# Patient Record
Sex: FEMALE | Race: WHITE | NOT HISPANIC OR LATINO | Employment: FULL TIME | URBAN - METROPOLITAN AREA
[De-identification: names, ages, dates, MRNs, and addresses within clinical notes are randomized per-mention and may not be internally consistent; named-entity substitution may affect disease eponyms.]

---

## 2019-10-08 ENCOUNTER — OFFICE VISIT (OUTPATIENT)
Dept: INTERNAL MEDICINE | Facility: CLINIC | Age: 29
End: 2019-10-08

## 2019-10-08 ENCOUNTER — HOSPITAL ENCOUNTER (OUTPATIENT)
Dept: GENERAL RADIOLOGY | Facility: HOSPITAL | Age: 29
Discharge: HOME | End: 2019-10-08
Admitting: NURSE PRACTITIONER

## 2019-10-08 VITALS
HEIGHT: 62 IN | TEMPERATURE: 97.7 F | HEART RATE: 88 BPM | WEIGHT: 180 LBS | SYSTOLIC BLOOD PRESSURE: 108 MMHG | BODY MASS INDEX: 33.13 KG/M2 | OXYGEN SATURATION: 98 % | DIASTOLIC BLOOD PRESSURE: 80 MMHG

## 2019-10-08 DIAGNOSIS — M79.671 RIGHT FOOT PAIN: Primary | ICD-10-CM

## 2019-10-08 DIAGNOSIS — M79.671 RIGHT FOOT PAIN: ICD-10-CM

## 2019-10-08 PROCEDURE — 99203 OFFICE O/P NEW LOW 30 MIN: CPT | Performed by: NURSE PRACTITIONER

## 2019-10-08 PROCEDURE — 73630 X-RAY EXAM OF FOOT: CPT

## 2019-10-08 RX ORDER — FOLIC ACID 1 MG/1
1 TABLET ORAL DAILY
COMMUNITY
End: 2021-01-28 | Stop reason: HOSPADM

## 2019-10-08 RX ORDER — BUSPIRONE HYDROCHLORIDE 10 MG/1
TABLET ORAL
Status: ON HOLD | COMMUNITY
Start: 2019-09-26 | End: 2021-01-28 | Stop reason: SDUPTHER

## 2019-10-08 RX ORDER — SUMATRIPTAN 100 MG/1
TABLET, FILM COATED ORAL
COMMUNITY
Start: 2019-09-24 | End: 2021-01-03 | Stop reason: HOSPADM

## 2019-10-08 RX ORDER — BISOPROLOL FUMARATE 5 MG/1
5 TABLET, FILM COATED ORAL DAILY
COMMUNITY
End: 2021-01-03 | Stop reason: HOSPADM

## 2019-10-08 RX ORDER — ONDANSETRON HYDROCHLORIDE 8 MG/1
TABLET, FILM COATED ORAL
COMMUNITY
Start: 2019-09-16 | End: 2019-11-13

## 2019-10-08 RX ORDER — LANOLIN ALCOHOL/MO/W.PET/CERES
1000 CREAM (GRAM) TOPICAL DAILY
COMMUNITY
End: 2021-01-28 | Stop reason: HOSPADM

## 2019-10-08 NOTE — PROGRESS NOTES
Chief Complaint   Patient presents with   • Foot Pain     numb on top of toes       History of Present Illness    Cheyanne Santa is a 29 y.o. female who presents today for   Foot Injury    The incident occurred 12 to 24 hours ago. The incident occurred at home. The injury mechanism was a fall (Walking upstairs and slipped back onto step below landing on ball of foot with toes hyperextended.). The pain is present in the right foot and right toes. The quality of the pain is described as aching and shooting. The pain is at a severity of 8/10. The pain is moderate. The pain has been constant since onset. Associated symptoms include an inability to bear weight and numbness (2nd, 3rd, and 4th toes). Pertinent negatives include no loss of motion, loss of sensation, muscle weakness or tingling. The symptoms are aggravated by weight bearing. She has tried ice, elevation and NSAIDs for the symptoms. The treatment provided mild relief.      [facility]    The following portions of the patient's history were reviewed and updated as appropriate: allergies, current medications, past family history, past medical history, past social history, past surgical history and problem list.     Social History     Tobacco Use   • Smoking status: Never Smoker   • Smokeless tobacco: Never Used   Substance Use Topics   • Alcohol use: No     Frequency: Never       Past Medical History:   Diagnosis Date   • Anxiety    • Depression    • Headache    • PCOS (polycystic ovarian syndrome)    • Syncope        Past Surgical History:   Procedure Laterality Date   • APPENDECTOMY     • GALLBLADDER SURGERY  2005   • WISDOM TOOTH EXTRACTION  2007       Family History   Problem Relation Age of Onset   • Cancer Mother    • Hypertension Mother    • Hypertension Father    • Anxiety disorder Father    • Alcohol abuse Father    • Hearing loss Maternal Grandfather        Allergies   Allergen Reactions   • Morphine Anaphylaxis   • Reglan [Metoclopramide] Anaphylaxis  "        Current Outpatient Medications:   •  Biotin 5000 MCG capsule, Take 1 capsule by mouth Daily., Disp: , Rfl:   •  bisoprolol (ZEBeta) 5 MG tablet, Take 5 mg by mouth Daily., Disp: , Rfl:   •  busPIRone (BUSPAR) 10 MG tablet, , Disp: , Rfl:   •  folic acid (FOLVITE) 1 MG tablet, Take 1 mg by mouth Daily., Disp: , Rfl:   •  norethindrone (AYGESTIN) 5 MG tablet, , Disp: , Rfl:   •  ondansetron (ZOFRAN) 8 MG tablet, , Disp: , Rfl:   •  SUMAtriptan (IMITREX) 100 MG tablet, , Disp: , Rfl:   •  vitamin B-12 (CYANOCOBALAMIN) 1000 MCG tablet, Take 1,000 mcg by mouth Daily., Disp: , Rfl:     Review of Systems  Review of Systems   Constitutional: Positive for activity change. Negative for chills, diaphoresis, fatigue and fever.   Respiratory: Negative for chest tightness, shortness of breath and wheezing.    Cardiovascular: Negative for chest pain, palpitations and leg swelling.   Musculoskeletal: Positive for arthralgias, gait problem and joint swelling. Negative for myalgias.   Skin: Negative for color change, rash and wound.   Neurological: Positive for numbness (2nd, 3rd, and 4th toes). Negative for dizziness, tingling, weakness, light-headedness and headaches.   Psychiatric/Behavioral: Negative for sleep disturbance.       Vitals:  Vitals:    10/08/19 1415   BP: 108/80   Pulse: 88   Temp: 97.7 °F (36.5 °C)   TempSrc: Oral   SpO2: 98%   Weight: 81.6 kg (180 lb)   Height: 157.5 cm (62\")   PainSc:   5       Physical Exam  Physical Exam   Constitutional: She is oriented to person, place, and time. Vital signs are normal. She appears well-developed and well-nourished.   Cardiovascular: Normal rate, regular rhythm and normal heart sounds.   Pulmonary/Chest: Effort normal and breath sounds normal.   Musculoskeletal: She exhibits tenderness.        Right foot: There is tenderness, bony tenderness and swelling. There is normal range of motion, normal capillary refill, no crepitus, no deformity and no laceration.      "   Neurological: She is alert and oriented to person, place, and time.   Skin: Skin is warm, dry and intact. No rash noted.   Psychiatric: She has a normal mood and affect. Her behavior is normal.   Nursing note and vitals reviewed.      Labs  No results found for this or any previous visit.    Assessment/Plan  Cheyanne was seen today for foot pain.    Diagnoses and all orders for this visit:    Right foot pain  -     XR Foot 3+ View Right; Future    right foot X-ray today  To r/o bony abnormality, no evidence of fracture upon physical exam. Advise rest; intermittent ice therapy 15-20 min, 2-3x/day; Ibuprofen every 6-8 hours as needed for pain; elevation when at rest. Try to avoid prolonged periods of walking or standing. Will notify of imaging results when received and refer as necessary.    Plan of care reviewed with patient at conclusion of today's visit. Patient education was provided regarding diagnosis, management, and prescribed or recommended OTC medications. Patient was informed to notify office of any new, worsening, or persistent symptoms. Patient verbalized understanding and agreement with plan of care.     Follow-Up  Return if symptoms worsen or fail to improve.    Electronically Signed By:  ALEX Arias

## 2019-10-09 ENCOUNTER — TELEPHONE (OUTPATIENT)
Dept: INTERNAL MEDICINE | Facility: CLINIC | Age: 29
End: 2019-10-09

## 2019-10-10 ENCOUNTER — TELEPHONE (OUTPATIENT)
Dept: INTERNAL MEDICINE | Facility: CLINIC | Age: 29
End: 2019-10-10

## 2019-10-11 ENCOUNTER — OFFICE VISIT (OUTPATIENT)
Dept: INTERNAL MEDICINE | Facility: CLINIC | Age: 29
End: 2019-10-11

## 2019-10-11 VITALS
DIASTOLIC BLOOD PRESSURE: 72 MMHG | OXYGEN SATURATION: 98 % | WEIGHT: 183 LBS | HEART RATE: 99 BPM | HEIGHT: 62 IN | BODY MASS INDEX: 33.68 KG/M2 | TEMPERATURE: 98.2 F | SYSTOLIC BLOOD PRESSURE: 116 MMHG

## 2019-10-11 DIAGNOSIS — M79.674 PAIN IN RIGHT TOE(S): Primary | ICD-10-CM

## 2019-10-11 PROCEDURE — 99213 OFFICE O/P EST LOW 20 MIN: CPT | Performed by: NURSE PRACTITIONER

## 2019-10-11 RX ORDER — IBUPROFEN 600 MG/1
600 TABLET ORAL EVERY 6 HOURS PRN
Qty: 15 TABLET | Refills: 0 | Status: SHIPPED | OUTPATIENT
Start: 2019-10-11 | End: 2021-01-03 | Stop reason: HOSPADM

## 2019-10-11 RX ORDER — PREDNISONE 10 MG/1
30 TABLET ORAL DAILY
Qty: 12 TABLET | Refills: 0 | Status: SHIPPED | OUTPATIENT
Start: 2019-10-11 | End: 2019-10-15

## 2019-10-11 NOTE — PROGRESS NOTES
Chief Complaint   Patient presents with   • Toe Pain     stabbing pain and numbness       History of Present Illness    Cheyanne Santa is a 29 y.o. female who presents today for toe pain after fall.  Toe Pain    The incident occurred 3 to 5 days ago (5 days). The incident occurred at home. The injury mechanism was a fall (hyperextension of toes when stepping backward and catching self after slippping backward on stairs). The pain is present in the right toes and right foot. The quality of the pain is described as aching. The pain has been constant since onset. Associated symptoms include numbness (2nd, 3rd, and 4th toes). Pertinent negatives include no loss of motion, muscle weakness or tingling. Associated symptoms comments: Pain with weight bearing. She reports no foreign bodies present. She has tried acetaminophen, elevation, ice, immobilization, NSAIDs and rest for the symptoms. The treatment provided mild relief.      [facility]    The following portions of the patient's history were reviewed and updated as appropriate: allergies, current medications, past family history, past medical history, past social history, past surgical history and problem list.     Social History     Tobacco Use   • Smoking status: Never Smoker   • Smokeless tobacco: Never Used   Substance Use Topics   • Alcohol use: No     Frequency: Never       Past Medical History:   Diagnosis Date   • Anxiety    • Depression    • Headache    • PCOS (polycystic ovarian syndrome)    • Syncope        Past Surgical History:   Procedure Laterality Date   • APPENDECTOMY     • GALLBLADDER SURGERY  2005   • WISDOM TOOTH EXTRACTION  2007       Family History   Problem Relation Age of Onset   • Cancer Mother    • Hypertension Mother    • Hypertension Father    • Anxiety disorder Father    • Alcohol abuse Father    • Hearing loss Maternal Grandfather        Allergies   Allergen Reactions   • Morphine Anaphylaxis   • Reglan [Metoclopramide] Anaphylaxis  "        Current Outpatient Medications:   •  Biotin 5000 MCG capsule, Take 1 capsule by mouth Daily., Disp: , Rfl:   •  bisoprolol (ZEBeta) 5 MG tablet, Take 5 mg by mouth Daily., Disp: , Rfl:   •  busPIRone (BUSPAR) 10 MG tablet, , Disp: , Rfl:   •  folic acid (FOLVITE) 1 MG tablet, Take 1 mg by mouth Daily., Disp: , Rfl:   •  norethindrone (AYGESTIN) 5 MG tablet, , Disp: , Rfl:   •  ondansetron (ZOFRAN) 8 MG tablet, , Disp: , Rfl:   •  SUMAtriptan (IMITREX) 100 MG tablet, , Disp: , Rfl:   •  vitamin B-12 (CYANOCOBALAMIN) 1000 MCG tablet, Take 1,000 mcg by mouth Daily., Disp: , Rfl:   •  ibuprofen (ADVIL,MOTRIN) 600 MG tablet, Take 1 tablet by mouth Every 6 (Six) Hours As Needed for Mild Pain  or Moderate Pain ., Disp: 15 tablet, Rfl: 0  •  predniSONE (DELTASONE) 10 MG tablet, Take 3 tablets by mouth Daily for 4 days., Disp: 12 tablet, Rfl: 0    Review of Systems  Review of Systems   Constitutional: Positive for activity change. Negative for chills, diaphoresis, fatigue and fever.   Respiratory: Negative for chest tightness, shortness of breath and wheezing.    Cardiovascular: Negative for chest pain, palpitations and leg swelling.   Musculoskeletal: Positive for arthralgias and gait problem. Negative for joint swelling and myalgias.   Skin: Negative for color change, rash and wound.   Neurological: Positive for numbness (2nd, 3rd, and 4th toes). Negative for dizziness, tingling, weakness, light-headedness and headaches.   Psychiatric/Behavioral: Negative for sleep disturbance.       Vitals:  Vitals:    10/11/19 1812   BP: 116/72   Pulse: 99   Temp: 98.2 °F (36.8 °C)   TempSrc: Oral   SpO2: 98%   Weight: 83 kg (183 lb)   Height: 157.5 cm (62.01\")   PainSc:   8       Physical Exam  Physical Exam   Constitutional: She is oriented to person, place, and time. Vital signs are normal. She appears well-developed and well-nourished.   Cardiovascular: Normal rate, regular rhythm and normal heart sounds.   Pulmonary/Chest: " Effort normal and breath sounds normal. No respiratory distress. She has no decreased breath sounds. She has no wheezes.   Musculoskeletal: She exhibits tenderness.        Right foot: There is tenderness. There is normal range of motion, no bony tenderness, no swelling, normal capillary refill, no crepitus, no deformity and no laceration.        Neurological: She is alert and oriented to person, place, and time. No sensory deficit. She exhibits normal muscle tone. Gait (limp due to pain with weight-bearing) abnormal. Coordination normal.   Skin: Skin is warm, dry and intact. No bruising, no ecchymosis and no lesion noted. No erythema.   Psychiatric: She has a normal mood and affect. Her behavior is normal.   Nursing note and vitals reviewed.      Labs  No results found for this or any previous visit.    Assessment/Plan  Cheyanne was seen today for toe pain.    Diagnoses and all orders for this visit:    Pain in right toe(s)  -     predniSONE (DELTASONE) 10 MG tablet; Take 3 tablets by mouth Daily for 4 days.  -     ibuprofen (ADVIL,MOTRIN) 600 MG tablet; Take 1 tablet by mouth Every 6 (Six) Hours As Needed for Mild Pain  or Moderate Pain .    Oral steroids to help with inflammation. Start Ibuprofen after finishing steroid therapy. Advised in rest, intermittent ice therapy, compression with Ace bandage, and elevation. May try metatarsal pad for added comfort. Avoid prolonged periods of standing or walking. Typical time frame for resolution of significant strain 2-3 weeks. Follow-up if worsening or no improvement for ortho referral.     Plan of care reviewed with patient at conclusion of today's visit. Patient education was provided regarding diagnosis, management, and prescribed or recommended OTC medications. Patient was informed to notify office of any new, worsening, or persistent symptoms. Patient verbalized understanding and agreement with plan of care.     Follow-Up  Return if symptoms worsen or fail to  improve.    Electronically Signed By:  ALEX Arias

## 2019-10-28 ENCOUNTER — OFFICE VISIT (OUTPATIENT)
Dept: INTERNAL MEDICINE | Facility: CLINIC | Age: 29
End: 2019-10-28

## 2019-10-28 VITALS
DIASTOLIC BLOOD PRESSURE: 80 MMHG | BODY MASS INDEX: 32.84 KG/M2 | SYSTOLIC BLOOD PRESSURE: 100 MMHG | OXYGEN SATURATION: 99 % | WEIGHT: 179.6 LBS | HEART RATE: 78 BPM

## 2019-10-28 DIAGNOSIS — M79.671 ACUTE FOOT PAIN, RIGHT: Primary | ICD-10-CM

## 2019-10-28 PROCEDURE — 99213 OFFICE O/P EST LOW 20 MIN: CPT | Performed by: PHYSICIAN ASSISTANT

## 2019-10-28 NOTE — PROGRESS NOTES
Chief Complaint   Patient presents with   • Right Foot Pain     Acute       Subjective   Cheyanne Santa is a 29 y.o. female.       History of Present Illness     2-3 weeks ago pt injured the toes on her right foot. Came in to be seen and was given steroids that helped her pain. She has been resting her foot but still has pain when she walks. Using new insoles and it does help but still hurts when she takes a step. Less swollen than it was. Increased pain when she spends any amount of time on her feet- works as a  so this is difficult.      Current Outpatient Medications:   •  Biotin 5000 MCG capsule, Take 1 capsule by mouth Daily., Disp: , Rfl:   •  bisoprolol (ZEBeta) 5 MG tablet, Take 5 mg by mouth Daily., Disp: , Rfl:   •  busPIRone (BUSPAR) 10 MG tablet, , Disp: , Rfl:   •  folic acid (FOLVITE) 1 MG tablet, Take 1 mg by mouth Daily., Disp: , Rfl:   •  ibuprofen (ADVIL,MOTRIN) 600 MG tablet, Take 1 tablet by mouth Every 6 (Six) Hours As Needed for Mild Pain  or Moderate Pain ., Disp: 15 tablet, Rfl: 0  •  norethindrone (AYGESTIN) 5 MG tablet, , Disp: , Rfl:   •  ondansetron (ZOFRAN) 8 MG tablet, , Disp: , Rfl:   •  SUMAtriptan (IMITREX) 100 MG tablet, , Disp: , Rfl:   •  vitamin B-12 (CYANOCOBALAMIN) 1000 MCG tablet, Take 1,000 mcg by mouth Daily., Disp: , Rfl:      [facility]  The following portions of the patient's history were reviewed and updated as appropriate: allergies, current medications, past family history, past medical history, past social history, past surgical history and problem list.    Review of Systems   Constitutional: Negative for activity change, appetite change and fatigue.   HENT: Negative for congestion and rhinorrhea.    Respiratory: Negative for chest tightness and shortness of breath.    Cardiovascular: Negative for chest pain and palpitations.   Gastrointestinal: Negative for abdominal pain.   Genitourinary: Negative for dysuria.   Musculoskeletal: Positive for arthralgias and  myalgias.   Neurological: Negative for dizziness, weakness, light-headedness and headaches.   Psychiatric/Behavioral: Negative for dysphoric mood. The patient is not nervous/anxious.        Objective   /80   Pulse 78   Wt 81.5 kg (179 lb 9.6 oz)   SpO2 99%   BMI 32.84 kg/m²     Physical Exam   Constitutional: She appears well-developed and well-nourished.   HENT:   Head: Normocephalic.   Right Ear: Hearing, tympanic membrane, external ear and ear canal normal.   Left Ear: Hearing, tympanic membrane, external ear and ear canal normal.   Nose: Nose normal.   Mouth/Throat: Oropharynx is clear and moist.   Eyes: Conjunctivae are normal. Pupils are equal, round, and reactive to light.   Neck: Normal range of motion.   Cardiovascular: Normal rate, regular rhythm and normal heart sounds.   Pulmonary/Chest: Effort normal and breath sounds normal. She has no decreased breath sounds. She has no wheezes. She has no rhonchi. She has no rales.   Musculoskeletal:        Right foot: There is tenderness and bony tenderness (distal metatarsal). There is normal range of motion, no swelling and no deformity.   Neurological: She is alert.   Skin: Skin is warm and dry.   Psychiatric: She has a normal mood and affect. Her behavior is normal.   Nursing note and vitals reviewed.      No results found for this or any previous visit.     ASSESSMENT/PLAN    Problem List Items Addressed This Visit     None      Visit Diagnoses     Acute foot pain, right    -  Primary    Continue rest, prn ibuprofen. Refer for MRI of foot due to persistent pain.    Relevant Orders    MRI foot right wo contrast               Return if symptoms worsen or fail to improve.

## 2019-11-05 ENCOUNTER — TELEPHONE (OUTPATIENT)
Dept: INTERNAL MEDICINE | Facility: CLINIC | Age: 29
End: 2019-11-05

## 2019-11-05 DIAGNOSIS — M79.671 RIGHT FOOT PAIN: Primary | ICD-10-CM

## 2019-11-05 NOTE — TELEPHONE ENCOUNTER
Patient was calling to inquire if the approval for her mri had been obtained before proceeding with the xray or further things like physical therapy patient does not have the financial capability to pay for an mri without insurance approval. Please call the patient back and advise her if this has been approved or not

## 2019-11-06 NOTE — TELEPHONE ENCOUNTER
I have routed the referral back to Carmen and she is going to inform Aysha that the MRI has been denied.

## 2019-11-06 NOTE — TELEPHONE ENCOUNTER
Per msg in referral: PER AIM, AUTH WAS DENIED. ADVANCED IMAGING MAY BE APPROPRIATE WHEN CONSERVATIVE TREATMENT REQUIREMENTS HAVE BEEN MET & SYMPTOMS HAVE NOT IMPROVED OR RESOLVED. FURTHER CLINICAL REVIEW IS REQD. PEER TO PEER CAN BE COMPLETED WITHIN 20 DAYS BY CALLING [facility]     Please contact the patient and inform her that her MRI has been denied. Thanks!

## 2019-11-12 NOTE — TELEPHONE ENCOUNTER
Pt advised MRI has been denied, she has already had the xray, she is willing to do the physical therapy, printed order and she will  and take it to her place of choice

## 2019-11-12 NOTE — TELEPHONE ENCOUNTER
Please let her know that we were notified of the denial of her MRI. She will need to get an xray and try physical therapy before it can be approved. I will put in an order for both of these- someone will call her about the PT and she can go to a diagnostic center for the xray.

## 2019-11-13 ENCOUNTER — OFFICE VISIT (OUTPATIENT)
Dept: INTERNAL MEDICINE | Facility: CLINIC | Age: 29
End: 2019-11-13

## 2019-11-13 VITALS
DIASTOLIC BLOOD PRESSURE: 74 MMHG | TEMPERATURE: 98.2 F | HEART RATE: 98 BPM | WEIGHT: 179 LBS | OXYGEN SATURATION: 98 % | BODY MASS INDEX: 32.94 KG/M2 | HEIGHT: 62 IN | SYSTOLIC BLOOD PRESSURE: 110 MMHG

## 2019-11-13 DIAGNOSIS — R11.2 NAUSEA AND VOMITING, INTRACTABILITY OF VOMITING NOT SPECIFIED, UNSPECIFIED VOMITING TYPE: Primary | ICD-10-CM

## 2019-11-13 DIAGNOSIS — A08.4 VIRAL GASTROENTERITIS: ICD-10-CM

## 2019-11-13 LAB
EXPIRATION DATE: NORMAL
FLUAV AG NPH QL: NEGATIVE
FLUBV AG NPH QL: NEGATIVE
INTERNAL CONTROL: NORMAL
Lab: NORMAL

## 2019-11-13 PROCEDURE — 87804 INFLUENZA ASSAY W/OPTIC: CPT | Performed by: NURSE PRACTITIONER

## 2019-11-13 PROCEDURE — 99213 OFFICE O/P EST LOW 20 MIN: CPT | Performed by: NURSE PRACTITIONER

## 2019-11-13 RX ORDER — ONDANSETRON 4 MG/1
4 TABLET, ORALLY DISINTEGRATING ORAL EVERY 8 HOURS PRN
Qty: 20 TABLET | Refills: 0 | Status: SHIPPED | OUTPATIENT
Start: 2019-11-13 | End: 2020-01-14 | Stop reason: SDUPTHER

## 2019-11-13 NOTE — PROGRESS NOTES
Chief Complaint   Patient presents with   • Vomiting     x3 days   • Headache   • Diarrhea     x3 days       History of Present Illness    Cheyanne Santa is a 29 y.o. female who presents today for nausea, vomiting, headache.    Vomiting    This is a new problem. Episode onset: 3 days. The problem occurs intermittently. The problem has been waxing and waning. The emesis has an appearance of stomach contents (denies bile, BRB, coffee ground emesis). There has been no fever. Associated symptoms include abdominal pain (cramping), chills, diarrhea (4x/day), headaches, myalgias and URI. Pertinent negatives include no chest pain, coughing, dizziness, fever or sweats. Associated symptoms comments: Rhinorrhea, congestion. Risk factors include ill contacts. She has tried increased fluids (ibuprofen) for the symptoms. The treatment provided mild relief.   active hemorrhoids - straining some with BM. Loose stools, bright yellow.    [facility]    The following portions of the patient's history were reviewed and updated as appropriate: allergies, current medications, past family history, past medical history, past social history, past surgical history and problem list.     Social History     Tobacco Use   • Smoking status: Never Smoker   • Smokeless tobacco: Never Used   Substance Use Topics   • Alcohol use: No     Frequency: Never       Past Medical History:   Diagnosis Date   • Anxiety    • Depression    • Headache    • PCOS (polycystic ovarian syndrome)    • Syncope        Past Surgical History:   Procedure Laterality Date   • APPENDECTOMY     • GALLBLADDER SURGERY  2005   • WISDOM TOOTH EXTRACTION  2007       Allergies   Allergen Reactions   • Morphine Anaphylaxis   • Reglan [Metoclopramide] Anaphylaxis         Current Outpatient Medications:   •  Biotin 5000 MCG capsule, Take 1 capsule by mouth Daily., Disp: , Rfl:   •  bisoprolol (ZEBeta) 5 MG tablet, Take 5 mg by mouth Daily., Disp: , Rfl:   •  busPIRone (BUSPAR) 10 MG  "tablet, , Disp: , Rfl:   •  folic acid (FOLVITE) 1 MG tablet, Take 1 mg by mouth Daily., Disp: , Rfl:   •  ibuprofen (ADVIL,MOTRIN) 600 MG tablet, Take 1 tablet by mouth Every 6 (Six) Hours As Needed for Mild Pain  or Moderate Pain ., Disp: 15 tablet, Rfl: 0  •  norethindrone (AYGESTIN) 5 MG tablet, , Disp: , Rfl:   •  SUMAtriptan (IMITREX) 100 MG tablet, , Disp: , Rfl:   •  vitamin B-12 (CYANOCOBALAMIN) 1000 MCG tablet, Take 1,000 mcg by mouth Daily., Disp: , Rfl:   •  ondansetron ODT (ZOFRAN-ODT) 4 MG disintegrating tablet, Take 1 tablet by mouth Every 8 (Eight) Hours As Needed for Nausea or Vomiting., Disp: 20 tablet, Rfl: 0    Review of Systems  Review of Systems   Constitutional: Positive for chills, diaphoresis and fatigue. Negative for fever.   HENT: Positive for congestion, postnasal drip and rhinorrhea. Negative for sinus pressure, sinus pain, sneezing, sore throat and trouble swallowing.    Eyes: Negative for visual disturbance.   Respiratory: Negative for cough, chest tightness, shortness of breath and wheezing.    Cardiovascular: Negative for chest pain and palpitations.   Gastrointestinal: Positive for abdominal pain (cramping), diarrhea (4x/day), nausea and vomiting. Negative for abdominal distention and constipation.   Genitourinary: Negative for dysuria, frequency and hematuria.   Musculoskeletal: Positive for myalgias.   Neurological: Positive for headaches. Negative for dizziness, light-headedness and numbness.   Psychiatric/Behavioral: Negative for sleep disturbance.       Vitals:  Vitals:    11/13/19 1659   BP: 110/74   Pulse: 98   Temp: 98.2 °F (36.8 °C)   TempSrc: Oral   SpO2: 98%   Weight: 81.2 kg (179 lb)   Height: 157.5 cm (62.01\")       Physical Exam  Physical Exam   Constitutional: She is oriented to person, place, and time. Vital signs are normal. She appears well-developed and well-nourished.   HENT:   Head: Normocephalic and atraumatic.   Right Ear: Tympanic membrane, external ear and " ear canal normal.   Left Ear: Tympanic membrane, external ear and ear canal normal.   Nose: Nose normal. No mucosal edema, rhinorrhea, sinus tenderness or congestion. Right sinus exhibits no maxillary sinus tenderness and no frontal sinus tenderness. Left sinus exhibits no maxillary sinus tenderness and no frontal sinus tenderness.   Mouth/Throat: Uvula is midline, oropharynx is clear and moist and mucous membranes are normal. No tonsillar exudate.   Eyes: Conjunctivae and lids are normal. Pupils are equal, round, and reactive to light.   Neck: Trachea normal, normal range of motion and phonation normal. Neck supple. No JVD present.   Cardiovascular: Normal rate, regular rhythm and normal heart sounds.   Pulmonary/Chest: Effort normal and breath sounds normal. No respiratory distress. She has no decreased breath sounds. She has no wheezes.   Abdominal: Soft. Normal appearance and bowel sounds are normal. She exhibits no distension, no abdominal bruit and no mass. There is no hepatosplenomegaly. There is no tenderness. There is no rigidity, no rebound, no guarding, no tenderness at McBurney's point and negative Romano's sign. No hernia.   Lymphadenopathy:        Head (right side): No submental, no submandibular, no tonsillar, no preauricular, no posterior auricular and no occipital adenopathy present.        Head (left side): No submental, no submandibular, no tonsillar, no preauricular, no posterior auricular and no occipital adenopathy present.     She has no cervical adenopathy.   Neurological: She is alert and oriented to person, place, and time.   Skin: Skin is warm and dry.   Psychiatric: She has a normal mood and affect. Her behavior is normal.   Nursing note and vitals reviewed.      Labs  Results for orders placed or performed in visit on 11/13/19   POC Influenza A / B   Result Value Ref Range    Rapid Influenza A Ag Negative Negative    Rapid Influenza B Ag Negative Negative    Internal Control Passed  Passed    Lot Number      Expiration Date         Assessment/Plan    Cheyanne was seen today for vomiting, headache and diarrhea.    Diagnoses and all orders for this visit:    Nausea and vomiting, intractability of vomiting not specified, unspecified vomiting type  -     POC Influenza A / B    Viral gastroenteritis  -     ondansetron ODT (ZOFRAN-ODT) 4 MG disintegrating tablet; Take 1 tablet by mouth Every 8 (Eight) Hours As Needed for Nausea or Vomiting.    Advised in oral rehydration therapy with water and electrolyte replacement. Encouraged bland diet and avoidance of offending foods. OTC anti-diarrheals recommended sparingly. Encouraged in proper hygiene measures prior to preparing and consuming foods and after bowel or bladder habits to avoid cross contamination to others in household. Advised in typical duration of 2-4 days for resolution of viral gastroenteritis symptoms. Encouraged to seek further follow-up in office of ER treatment after hours for worsening symptoms and/or inability to keep food/fluids down for 24 hours or more. Follow-up in 2 days if no improvement or worsening.      Plan of care reviewed with patient at conclusion of tday's visit. Patient education was provided regarding diagnosis, management, and prescribed or recommended OTC medications. Patient was informed to notify office of any new, worsening, or persistent symptoms. Patient verbalized understanding and agreement with plan of care.     Follow-Up  Return in about 2 days (around 11/15/2019), or if symptoms worsen or fail to improve.    Electronically Signed By:  ALEX Arias

## 2019-11-19 ENCOUNTER — OFFICE VISIT (OUTPATIENT)
Dept: OBSTETRICS AND GYNECOLOGY | Facility: CLINIC | Age: 29
End: 2019-11-19

## 2019-11-19 ENCOUNTER — APPOINTMENT (OUTPATIENT)
Dept: LAB | Facility: HOSPITAL | Age: 29
End: 2019-11-19

## 2019-11-19 VITALS
DIASTOLIC BLOOD PRESSURE: 82 MMHG | HEIGHT: 62 IN | WEIGHT: 177.2 LBS | SYSTOLIC BLOOD PRESSURE: 124 MMHG | BODY MASS INDEX: 32.61 KG/M2

## 2019-11-19 DIAGNOSIS — N94.10 FEMALE DYSPAREUNIA: ICD-10-CM

## 2019-11-19 DIAGNOSIS — N92.6 IRREGULAR MENSES: Primary | ICD-10-CM

## 2019-11-19 LAB
ESTRADIOL SERPL HS-MCNC: 31.6 PG/ML
FSH SERPL-ACNC: 6.93 MIU/ML
PROLACTIN SERPL-MCNC: 8.83 NG/ML (ref 4.79–23.3)
TESTOST SERPL-MCNC: 9.48 NG/DL (ref 8.4–48.1)
TSH SERPL DL<=0.05 MIU/L-ACNC: 2.48 UIU/ML (ref 0.27–4.2)

## 2019-11-19 PROCEDURE — 99203 OFFICE O/P NEW LOW 30 MIN: CPT | Performed by: OBSTETRICS & GYNECOLOGY

## 2019-11-19 PROCEDURE — 82627 DEHYDROEPIANDROSTERONE: CPT | Performed by: OBSTETRICS & GYNECOLOGY

## 2019-11-19 PROCEDURE — 36415 COLL VENOUS BLD VENIPUNCTURE: CPT | Performed by: OBSTETRICS & GYNECOLOGY

## 2019-11-19 PROCEDURE — 84146 ASSAY OF PROLACTIN: CPT | Performed by: OBSTETRICS & GYNECOLOGY

## 2019-11-19 PROCEDURE — 84443 ASSAY THYROID STIM HORMONE: CPT | Performed by: OBSTETRICS & GYNECOLOGY

## 2019-11-19 PROCEDURE — 83498 ASY HYDROXYPROGESTERONE 17-D: CPT | Performed by: OBSTETRICS & GYNECOLOGY

## 2019-11-19 PROCEDURE — 84403 ASSAY OF TOTAL TESTOSTERONE: CPT | Performed by: OBSTETRICS & GYNECOLOGY

## 2019-11-19 PROCEDURE — 83001 ASSAY OF GONADOTROPIN (FSH): CPT | Performed by: OBSTETRICS & GYNECOLOGY

## 2019-11-19 PROCEDURE — 82670 ASSAY OF TOTAL ESTRADIOL: CPT | Performed by: OBSTETRICS & GYNECOLOGY

## 2019-11-19 NOTE — PROGRESS NOTES
Subjective   Chief Complaint   Patient presents with   • Gynecologic Exam     diagnosed w/PCOS at age 12; pt here for a second opinion/confirmation; c/o pain w/SIC and bleeding     Cheyanne Santa is a 29 y.o. year old .  No LMP recorded (lmp unknown). Patient is not currently having periods (Reason: Oral contraceptives).  She presents to be seen because of irregular menses and pain with intercourse. Patient reports that at around age 12-14 she was told that she was diagnosed with PCOS. She states that she was having irregular and infrequent cycles. She is uncertain of the work-up but does remember having an ultrasound in which she was told she had cysts. Patient reports that in her early twenties she discontinued the OCPs secondary to nausea and no need for contraception at the the time. Patient states that when she went off the OCPs that she her cycles were unpredictable, infrequent and heavy. Patient has been seeing a GYN in [place] who has been prescribing Aygestin 5mg once daily for the last year. Patient reports that her menses are painful and that she also experiences this cramping like pain when she is not bleeding. She reports many years ago she had an abundance of unwanted hair growth and reports in last several years she has been experiencing hair loss. Patient also reports pain with intercourse for the last 5 years. Patient reports she experiences a stabbing like pain in her lower abdomen with intercourse. She has no discomfort with insertion. She reports that she also experiences bleeding. Last pap smear: . Denies any history of abnormal pap smears. Denies any history of sexually transmitted infections. Denies any history of sexual or physical abuse. Patient has a history of anxiety and depression. This issues stem from losing her mother to breast cancer 11 years ago. She is currently taking Buspar. She does not participate in counseling.    OTHER THINGS SHE WANTS TO DISCUSS  "TODAY:  Nothing else    The following portions of the patient's history were reviewed and updated as appropriate:current medications, allergies, past family history, past medical history, past social history and past surgical history    Social History    Tobacco Use      Smoking status: Never Smoker      Smokeless tobacco: Never Used    Review of Systems  Constitutional POS: fatigue    NEG: anorexia or night sweats   Genitourinary POS: nothing reported    NEG: dysuria or hematuria   Gastointestinal POS: nothing reported    NEG: bloating, change in bowel habits, melena or reflux symptoms   Integument POS: nothing reported    NEG: moles that are changing in size, shape, color or rashes   Breast POS: nothing reported    NEG: persistent breast lump, skin dimpling or nipple discharge         Objective   /82   Ht 157.5 cm (62.01\")   Wt 80.4 kg (177 lb 3.2 oz)   LMP  (LMP Unknown)   Breastfeeding? No   BMI 32.40 kg/m²     General:  well developed; well nourished  no acute distress   Skin:  Not performed.   Thyroid: not examined   Lungs:  breathing is unlabored   Heart:  Not performed.   Breasts:  Not performed.   Abdomen: soft, non-tender; no masses  no umbilical or inguinal hernias are present  no hepato-splenomegaly   Pelvis: Exam limited by  anxiety  Clinical staff was present for exam  External genitalia:  normal appearance of the external genitalia including Bartholin's and Dumfries's glands.  :  urethral meatus normal;  Vaginal:  normal pink mucosa without prolapse or lesions.  Cervix:  normal appearance.  Uterus:  normal size, shape and consistency.  Adnexa:  normal bimanual exam of the adnexa.  Pelvic floor pain: her symptoms ARE reproduced with palpation under the bladder base;     Lab Review   No data reviewed    Imaging   No data reviewed        Assessment   1. Irregular Menses  2. Dyspareunia     Plan   1. The following tests were ordered today: DHEA-s, ESR, FSH, PRL, total testosterone, TSH and " 17-OHP.  It was explained to Cheyanne that all lab test should be back within the one week after they are performed. She will be notified about the results, regardless of the findings. If she has not been contacted by the office within 2 weeks after the test has been performed, it is her responsibility to contact us to learn about her results.  2. Ultrasound needs to be done any time that is convenient for her.   3. The importance of keeping all planned follow-up and taking all medications as prescribed was emphasized.  4. Follow up after ultrasound.    No orders of the defined types were placed in this encounter.         This note was electronically signed.    Enmanuel Ochoa M.D.  November 19, 2019    Note: Speech recognition transcription software may have been used to create portions of this document.  An attempt at proofreading has been made but errors in transcription could still be present.

## 2019-11-20 LAB — DHEA-S SERPL-MCNC: 92.8 UG/DL (ref 84.8–378)

## 2019-11-22 LAB — 17OHP SERPL-MCNC: 26 NG/DL

## 2019-12-02 ENCOUNTER — OFFICE VISIT (OUTPATIENT)
Dept: OBSTETRICS AND GYNECOLOGY | Facility: CLINIC | Age: 29
End: 2019-12-02

## 2019-12-02 VITALS
HEIGHT: 62 IN | BODY MASS INDEX: 32.76 KG/M2 | DIASTOLIC BLOOD PRESSURE: 84 MMHG | SYSTOLIC BLOOD PRESSURE: 118 MMHG | WEIGHT: 178 LBS

## 2019-12-02 DIAGNOSIS — N94.10 FEMALE DYSPAREUNIA: ICD-10-CM

## 2019-12-02 DIAGNOSIS — N92.6 IRREGULAR MENSES: Primary | ICD-10-CM

## 2019-12-02 PROCEDURE — 99213 OFFICE O/P EST LOW 20 MIN: CPT | Performed by: OBSTETRICS & GYNECOLOGY

## 2019-12-02 NOTE — PROGRESS NOTES
"Subjective   Chief Complaint   Patient presents with   • Follow-up     f/u after u/s     Cheyanne Santa is a 29 y.o. year old  who comes to review her recent testing and discuss next steps.    OTHER THINGS SHE WANTS TO DISCUSS TODAY:  Nothing else       Objective   /84   Ht 157.5 cm (62.01\")   Wt 80.7 kg (178 lb)   LMP  (LMP Unknown)   Breastfeeding? No   BMI 32.55 kg/m²     Lab Review   FSH, Prolactin, TSH, Estradiol, Testosterone, DHEAS, 17-OHP    Imaging   Pelvic ultrasound report       Assessment   1. Irregular Menses  2. Dyspareunia     Plan   1. Discussed normal lab and ultrasound results.  2. Will start on lo-loestrin for cycle irregularity and dysmenorrhea. Instructed patient it can take 3-4 cycles to regulate.  3. Referral to physical therapy for pelvic floor therapy.  4. The importance of keeping all planned follow-up and taking all medications as prescribed was emphasized.  5. Follow up for recheck of cycles in 4 months    New Medications Ordered This Visit   Medications   • Norethin-Eth Estrad-Fe Biphas 1 MG-10 MCG / 10 MCG tablet     Sig: Take 1 tablet by mouth Daily.     Dispense:  84 tablet     Refill:  4          Total time spent today with Cheyanne  was 20 minutes (level 3).  Greater than 50% of the time was spent face-to-face coordinating care, answering her questions and counseling regarding pathophysiology of her presenting problem along with plans for any diagnositc work-up and treatment.    This note was electronically signed.    Kaylan Keith, DO  2019    Note: Speech recognition transcription software may have been used to create portions of this document.  An attempt at proofreading has been made but errors in transcription could still be present.    "

## 2020-01-14 ENCOUNTER — TELEPHONE (OUTPATIENT)
Dept: OBSTETRICS AND GYNECOLOGY | Facility: CLINIC | Age: 30
End: 2020-01-14

## 2020-01-14 DIAGNOSIS — A08.4 VIRAL GASTROENTERITIS: ICD-10-CM

## 2020-01-14 RX ORDER — ONDANSETRON 4 MG/1
4 TABLET, ORALLY DISINTEGRATING ORAL EVERY 8 HOURS PRN
Qty: 20 TABLET | Refills: 0 | Status: SHIPPED | OUTPATIENT
Start: 2020-01-14 | End: 2020-02-07 | Stop reason: SDUPTHER

## 2020-02-07 ENCOUNTER — TELEPHONE (OUTPATIENT)
Dept: OBSTETRICS AND GYNECOLOGY | Facility: CLINIC | Age: 30
End: 2020-02-07

## 2020-02-07 DIAGNOSIS — A08.4 VIRAL GASTROENTERITIS: ICD-10-CM

## 2020-02-07 RX ORDER — ONDANSETRON 4 MG/1
4 TABLET, ORALLY DISINTEGRATING ORAL DAILY
Qty: 5 TABLET | Refills: 0 | Status: SHIPPED | OUTPATIENT
Start: 2020-02-07 | End: 2020-03-02 | Stop reason: SDUPTHER

## 2020-02-07 NOTE — TELEPHONE ENCOUNTER
Spoke with Cheyanne, she is requesting Zofran prescription refill from Dr Keith since her OCP makes her nauseous on a daily basis. I advised Cheyanne that this issue wasn't noted in Dr Keith's notes from her visits therefore I would be required to contact Dr Keith for refill permission. Prescription for Zofran 4mg ODT #5 sent to her pharmacy until Dr Keith is back I office on Monday.

## 2020-03-02 ENCOUNTER — TELEPHONE (OUTPATIENT)
Dept: OBSTETRICS AND GYNECOLOGY | Facility: CLINIC | Age: 30
End: 2020-03-02

## 2020-03-02 DIAGNOSIS — A08.4 VIRAL GASTROENTERITIS: ICD-10-CM

## 2020-03-02 RX ORDER — ONDANSETRON 4 MG/1
4 TABLET, ORALLY DISINTEGRATING ORAL DAILY
Qty: 30 TABLET | Refills: 1 | Status: SHIPPED | OUTPATIENT
Start: 2020-03-02 | End: 2021-01-03 | Stop reason: HOSPADM

## 2020-03-02 NOTE — TELEPHONE ENCOUNTER
Refill sent. Patient was scheduled for follow up in April when I am out. It doesn't look like that has been rescheduled -- can we get her on the schedule?

## 2020-03-02 NOTE — TELEPHONE ENCOUNTER
Pt returned call; states she needs another refill on Zofran--she takes these with her OCP because they make her nauseous. Pt advised this would be routed to Dr. Keith for review.

## 2020-07-10 LAB
EXTERNAL CHLAMYDIA SCREEN: NEGATIVE
EXTERNAL GONORRHEA SCREEN: NEGATIVE
EXTERNAL HEPATITIS B SURFACE ANTIGEN: NEGATIVE
EXTERNAL RUBELLA QUALITATIVE: NORMAL
HIV1 P24 AG SERPL QL IA: NORMAL

## 2020-10-30 LAB
EXTERNAL GLUCOSE TOLERANCE TEST FASTING: 95 MG/DL
EXTERNAL SYPHILIS RPR SCREEN: NORMAL

## 2020-12-31 ENCOUNTER — TELEPHONE (OUTPATIENT)
Dept: OBSTETRICS AND GYNECOLOGY | Facility: CLINIC | Age: 30
End: 2020-12-31

## 2021-01-03 ENCOUNTER — HOSPITAL ENCOUNTER (OUTPATIENT)
Facility: HOSPITAL | Age: 31
End: 2021-01-03
Attending: OBSTETRICS & GYNECOLOGY | Admitting: OBSTETRICS & GYNECOLOGY

## 2021-01-03 ENCOUNTER — HOSPITAL ENCOUNTER (OUTPATIENT)
Facility: HOSPITAL | Age: 31
Setting detail: OBSERVATION
Discharge: HOME | End: 2021-01-03
Attending: OBSTETRICS & GYNECOLOGY | Admitting: OBSTETRICS & GYNECOLOGY

## 2021-01-03 VITALS
HEART RATE: 116 BPM | RESPIRATION RATE: 18 BRPM | BODY MASS INDEX: 40.12 KG/M2 | SYSTOLIC BLOOD PRESSURE: 112 MMHG | WEIGHT: 218 LBS | TEMPERATURE: 98.3 F | HEIGHT: 62 IN | DIASTOLIC BLOOD PRESSURE: 76 MMHG

## 2021-01-03 PROBLEM — O36.8190 DECREASED FETAL MOVEMENT: Status: ACTIVE | Noted: 2021-01-03

## 2021-01-03 PROCEDURE — G0378 HOSPITAL OBSERVATION PER HR: HCPCS

## 2021-01-03 PROCEDURE — 59025 FETAL NON-STRESS TEST: CPT | Performed by: OBSTETRICS & GYNECOLOGY

## 2021-01-03 PROCEDURE — 99218 PR INITIAL OBSERVATION CARE/DAY 30 MINUTES: CPT | Performed by: OBSTETRICS & GYNECOLOGY

## 2021-01-03 PROCEDURE — 59025 FETAL NON-STRESS TEST: CPT

## 2021-01-03 NOTE — H&P
[facility]  Obstetric History and Physical    Referring Provider: Gilberto Hawley DO      Chief Complaint   Patient presents with   • Decreased Fetal Movement       Subjective     Patient is a 30 y.o. female  currently at 37w3d, who presents with C/O decreased fetal movement.  Patient reports decreased fetal movement today without associated leaking of fluid, vaginal bleeding, recent trauma, fever, short of breath, chest pain or  Any other associated symptoms or concerns.  Patient does feel fetal movement but however just less than usual.  Prenatal care in [facility], currently transferring care to [facility] of [place].  Reports prenatal care has been uneventful to date.        The following portions of the patients history were reviewed and updated as appropriate: current medications, allergies, past medical history, past surgical history, past family history, past social history and problem list .       Prenatal Information:   Maternal Prenatal Labs  Blood Type No results found for: ABO   Rh Status No results found for: RH   Antibody Screen No results found for: ABSCRN   Gonnorhea No results found for: GCCX   Chlamydia No results found for: CLAMYDCU   RPR No results found for: RPR   Syphilis Antibody No results found for: SYPHILIS   Rubella No results found for: RUBELLAIGGIN   Hepatitis B Surface Antigen No results found for: HEPBSAG   HIV-1 Antibody No results found for: LABHIV1   Hepatitis C Antibody No results found for: HEPCAB   Rapid Urin Drug Screen No results found for: AMPMETHU, BARBITSCNUR, LABBENZSCN, LABMETHSCN, LABOPIASCN, THCURSCR, COCAINEUR, AMPHETSCREEN, PROPOXSCN, BUPRENORSCNU, METAMPSCNUR, OXYCODONESCN, TRICYCLICSCN   Group B Strep Culture No results found for: GBSANTIGEN                 Past OB History:       OB History    Para Term  AB Living   1 0 0 0 0 0   SAB TAB Ectopic Molar Multiple Live Births   0 0 0 0 0 0      # Outcome Date GA Lbr Juan R/2nd Weight Sex  Delivery Anes PTL Lv   1 Current                Past Medical History: Past Medical History:   Diagnosis Date   • Anxiety    • Depression    • Migraines    • PCOS (polycystic ovarian syndrome)    • Polycystic ovary disease 2002   • Polycystic ovary syndrome 2002   • Syncope       Past Surgical History Past Surgical History:   Procedure Laterality Date   • APPENDECTOMY     • GALLBLADDER SURGERY  2005   • WISDOM TOOTH EXTRACTION  2007      Family History: Family History   Problem Relation Age of Onset   • Hypertension Mother    • Breast cancer Mother 46   • Hypertension Father    • Anxiety disorder Father    • Alcohol abuse Father    • Hearing loss Maternal Grandfather    • Ovarian cancer Neg Hx    • Uterine cancer Neg Hx    • Endometrial cancer Neg Hx    • Colon cancer Neg Hx       Social History:  reports that she has never smoked. She has never used smokeless tobacco.   reports no history of alcohol use.   reports no history of drug use.                   General ROS Negative Findings:Headaches, Visual Changes, Epigastric pain, Anorexia, Nausia/Vomiting, ROM and Vaginal Bleeding    ROS     All other systems have been reviewed and are neg  Objective       Vital Signs Range for the last 24 hours  Temperature: Temp:  [98.3 °F (36.8 °C)] 98.3 °F (36.8 °C)   Temp Source: Temp src: Oral   BP: BP: (112-121)/(76-80) 112/76   Pulse: Heart Rate:  [116-130] 116   Respirations: Resp:  [18] 18   SPO2:     O2 Amount (l/min):     O2 Devices     Weight: Weight:  [98.9 kg (218 lb)] 98.9 kg (218 lb)     Physical Examination:   General:   alert, appears stated age and cooperative   Skin:   normal   HEENT:  Sclera clear   Lungs:      Heart:      Gastrointestinal:  Soft, gravid uterus nontender, no guarding, rebound benign exam   Lower Extremities:  Trace edema, no calf tenderness   : Exam deferred.   Musculoskeletal:   No deformities noted full range of motion upper lower extremity   Neuro:  No focal deficits, DTR 2+ 4 no clonus                Fetal Heart Rate Assessment   Method:     Beats/min:     Baseline:     Varibility:     Accels:     Decels:     Tracing Category:     NST-indication decreased fetal movement, interpretation reactive, moderate variability, accelerations present 15 x 15, no decelerations noted, onset 1450, end time 1536, rare contraction noted  Uterine Assessment   Method: Method: palpation   Frequency (min):     Ctx Count in 10 min:     Duration:     Intensity:     Intensity by IUPC:     Resting Tone: Uterine Resting Tone: soft by palpation   Resting Tone by IUPC:     Burr Units:       Laboratory Results:   Lab Results (last 24 hours)     ** No results found for the last 24 hours. **        Radiology Review:   Imaging Results (Last 24 Hours)     ** No results found for the last 24 hours. **        Other Studies:    Assessment/Plan       Decreased fetal movement        Assessment:  1.  Intrauterine pregnancy at 37w3d weeks gestation with reactive fetal status.    2.  History of decreased fetal movement-normal fetal assessment  3.   4.      Plan:  1.  Discharged home, kick count, labor instructions follow-up provider this week for routine  visit or as needed  2. Plan of care has been reviewed with patient.  3.  Risks, benefits of treatment plan have been discussed.  4.  All questions have been answered.  5      Gilberto Hawley,   1/3/2021  15:48 EST

## 2021-01-05 ENCOUNTER — LAB (OUTPATIENT)
Dept: LAB | Facility: HOSPITAL | Age: 31
End: 2021-01-05

## 2021-01-05 ENCOUNTER — TRANSCRIBE ORDERS (OUTPATIENT)
Dept: LAB | Facility: HOSPITAL | Age: 31
End: 2021-01-05

## 2021-01-05 DIAGNOSIS — Z3A.37 37 WEEKS GESTATION OF PREGNANCY: ICD-10-CM

## 2021-01-05 DIAGNOSIS — Z34.83 PRENATAL CARE, SUBSEQUENT PREGNANCY, THIRD TRIMESTER: ICD-10-CM

## 2021-01-05 DIAGNOSIS — Z3A.37 37 WEEKS GESTATION OF PREGNANCY: Primary | ICD-10-CM

## 2021-01-05 LAB
AMPHET+METHAMPHET UR QL: NEGATIVE
AMPHETAMINES UR QL: NEGATIVE
BARBITURATES UR QL SCN: NEGATIVE
BENZODIAZ UR QL SCN: NEGATIVE
BUPRENORPHINE SERPL-MCNC: NEGATIVE NG/ML
CANNABINOIDS SERPL QL: NEGATIVE
COCAINE UR QL: NEGATIVE
EXTERNAL GROUP B STREP ANTIGEN: NEGATIVE
METHADONE UR QL SCN: NEGATIVE
OPIATES UR QL: NEGATIVE
OXYCODONE UR QL SCN: NEGATIVE
PCP UR QL SCN: NEGATIVE
PROPOXYPH UR QL: NEGATIVE
TRICYCLICS UR QL SCN: NEGATIVE

## 2021-01-05 PROCEDURE — 80306 DRUG TEST PRSMV INSTRMNT: CPT

## 2021-01-24 ENCOUNTER — APPOINTMENT (OUTPATIENT)
Dept: PREADMISSION TESTING | Facility: HOSPITAL | Age: 31
End: 2021-01-24

## 2021-01-25 ENCOUNTER — HOSPITAL ENCOUNTER (INPATIENT)
Facility: HOSPITAL | Age: 31
LOS: 3 days | Discharge: HOME | End: 2021-01-28
Attending: OBSTETRICS & GYNECOLOGY | Admitting: OBSTETRICS & GYNECOLOGY

## 2021-01-25 DIAGNOSIS — E66.01 MORBIDLY OBESE (HCC): ICD-10-CM

## 2021-01-25 PROBLEM — O48.0 POST TERM PREGNANCY OVER 40 WEEKS: Status: ACTIVE | Noted: 2021-01-25

## 2021-01-25 LAB
ABO GROUP BLD: NORMAL
ABO GROUP BLD: NORMAL
ALP SERPL-CCNC: 155 U/L (ref 39–117)
ALT SERPL W P-5'-P-CCNC: 9 U/L (ref 1–33)
AST SERPL-CCNC: 17 U/L (ref 1–32)
BACTERIA UR QL AUTO: ABNORMAL /HPF
BILIRUB SERPL-MCNC: 0.2 MG/DL (ref 0–1.2)
BILIRUB UR QL STRIP: NEGATIVE
BILIRUB UR QL STRIP: NEGATIVE
BLD GP AB SCN SERPL QL: NEGATIVE
CLARITY UR: CLEAR
CLARITY UR: CLEAR
COLOR UR: YELLOW
COLOR UR: YELLOW
CREAT SERPL-MCNC: 0.46 MG/DL (ref 0.57–1)
DEPRECATED RDW RBC AUTO: 45.1 FL (ref 37–54)
ERYTHROCYTE [DISTWIDTH] IN BLOOD BY AUTOMATED COUNT: 13.3 % (ref 12.3–15.4)
GLUCOSE UR STRIP-MCNC: NEGATIVE MG/DL
GLUCOSE UR STRIP-MCNC: NEGATIVE MG/DL
HCT VFR BLD AUTO: 38.3 % (ref 34–46.6)
HGB BLD-MCNC: 12.3 G/DL (ref 12–15.9)
HGB UR QL STRIP.AUTO: NEGATIVE
HGB UR QL STRIP.AUTO: NEGATIVE
HYALINE CASTS UR QL AUTO: ABNORMAL /LPF
KETONES UR QL STRIP: NEGATIVE
KETONES UR QL STRIP: NEGATIVE
LDH SERPL-CCNC: 243 U/L (ref 135–214)
LEUKOCYTE ESTERASE UR QL STRIP.AUTO: ABNORMAL
LEUKOCYTE ESTERASE UR QL STRIP.AUTO: ABNORMAL
MCH RBC QN AUTO: 29.8 PG (ref 26.6–33)
MCHC RBC AUTO-ENTMCNC: 32.1 G/DL (ref 31.5–35.7)
MCV RBC AUTO: 92.7 FL (ref 79–97)
NITRITE UR QL STRIP: NEGATIVE
NITRITE UR QL STRIP: NEGATIVE
PH UR STRIP.AUTO: 7 [PH] (ref 5–8)
PH UR STRIP.AUTO: 7 [PH] (ref 5–8)
PLATELET # BLD AUTO: 240 10*3/MM3 (ref 140–450)
PMV BLD AUTO: 11.1 FL (ref 6–12)
PROT UR QL STRIP: NEGATIVE
PROT UR QL STRIP: NEGATIVE
RBC # BLD AUTO: 4.13 10*6/MM3 (ref 3.77–5.28)
RBC # UR: ABNORMAL /HPF
REF LAB TEST METHOD: ABNORMAL
RH BLD: POSITIVE
RH BLD: POSITIVE
SARS-COV-2 RDRP RESP QL NAA+PROBE: DETECTED
SP GR UR STRIP: 1.01 (ref 1–1.03)
SP GR UR STRIP: 1.01 (ref 1–1.03)
SQUAMOUS #/AREA URNS HPF: ABNORMAL /HPF
T&S EXPIRATION DATE: NORMAL
URATE SERPL-MCNC: 4.8 MG/DL (ref 2.4–5.7)
UROBILINOGEN UR QL STRIP: ABNORMAL
UROBILINOGEN UR QL STRIP: ABNORMAL
WBC # BLD AUTO: 12.59 10*3/MM3 (ref 3.4–10.8)
WBC UR QL AUTO: ABNORMAL /HPF

## 2021-01-25 PROCEDURE — 59025 FETAL NON-STRESS TEST: CPT

## 2021-01-25 PROCEDURE — 81001 URINALYSIS AUTO W/SCOPE: CPT | Performed by: NURSE PRACTITIONER

## 2021-01-25 PROCEDURE — 87635 SARS-COV-2 COVID-19 AMP PRB: CPT | Performed by: OBSTETRICS & GYNECOLOGY

## 2021-01-25 PROCEDURE — 86901 BLOOD TYPING SEROLOGIC RH(D): CPT | Performed by: NURSE PRACTITIONER

## 2021-01-25 PROCEDURE — 36415 COLL VENOUS BLD VENIPUNCTURE: CPT | Performed by: NURSE PRACTITIONER

## 2021-01-25 PROCEDURE — 82247 BILIRUBIN TOTAL: CPT | Performed by: NURSE PRACTITIONER

## 2021-01-25 PROCEDURE — 86850 RBC ANTIBODY SCREEN: CPT | Performed by: NURSE PRACTITIONER

## 2021-01-25 PROCEDURE — 59200 INSERT CERVICAL DILATOR: CPT | Performed by: OBSTETRICS & GYNECOLOGY

## 2021-01-25 PROCEDURE — 84550 ASSAY OF BLOOD/URIC ACID: CPT | Performed by: NURSE PRACTITIONER

## 2021-01-25 PROCEDURE — 84450 TRANSFERASE (AST) (SGOT): CPT | Performed by: NURSE PRACTITIONER

## 2021-01-25 PROCEDURE — 86900 BLOOD TYPING SEROLOGIC ABO: CPT | Performed by: NURSE PRACTITIONER

## 2021-01-25 PROCEDURE — 85027 COMPLETE CBC AUTOMATED: CPT | Performed by: NURSE PRACTITIONER

## 2021-01-25 PROCEDURE — 86901 BLOOD TYPING SEROLOGIC RH(D): CPT

## 2021-01-25 PROCEDURE — 84075 ASSAY ALKALINE PHOSPHATASE: CPT | Performed by: NURSE PRACTITIONER

## 2021-01-25 PROCEDURE — 86900 BLOOD TYPING SEROLOGIC ABO: CPT

## 2021-01-25 PROCEDURE — 82565 ASSAY OF CREATININE: CPT | Performed by: NURSE PRACTITIONER

## 2021-01-25 PROCEDURE — 25010000003 MEPERIDINE PER 100 MG: Performed by: ADVANCED PRACTICE MIDWIFE

## 2021-01-25 PROCEDURE — 83615 LACTATE (LD) (LDH) ENZYME: CPT | Performed by: NURSE PRACTITIONER

## 2021-01-25 PROCEDURE — 84460 ALANINE AMINO (ALT) (SGPT): CPT | Performed by: NURSE PRACTITIONER

## 2021-01-25 RX ORDER — SODIUM CHLORIDE 0.9 % (FLUSH) 0.9 %
10 SYRINGE (ML) INJECTION EVERY 12 HOURS SCHEDULED
Status: DISCONTINUED | OUTPATIENT
Start: 2021-01-25 | End: 2021-01-27

## 2021-01-25 RX ORDER — MISOPROSTOL 200 UG/1
800 TABLET ORAL AS NEEDED
Status: DISCONTINUED | OUTPATIENT
Start: 2021-01-25 | End: 2021-01-26 | Stop reason: HOSPADM

## 2021-01-25 RX ORDER — OXYTOCIN-SODIUM CHLORIDE 0.9% IV SOLN 30 UNIT/500ML 30-0.9/5 UT/ML-%
650 SOLUTION INTRAVENOUS ONCE
Status: COMPLETED | OUTPATIENT
Start: 2021-01-25 | End: 2021-01-26

## 2021-01-25 RX ORDER — SODIUM CHLORIDE, SODIUM LACTATE, POTASSIUM CHLORIDE, CALCIUM CHLORIDE 600; 310; 30; 20 MG/100ML; MG/100ML; MG/100ML; MG/100ML
125 INJECTION, SOLUTION INTRAVENOUS CONTINUOUS
Status: DISCONTINUED | OUTPATIENT
Start: 2021-01-25 | End: 2021-01-27

## 2021-01-25 RX ORDER — OXYTOCIN-SODIUM CHLORIDE 0.9% IV SOLN 30 UNIT/500ML 30-0.9/5 UT/ML-%
85 SOLUTION INTRAVENOUS ONCE
Status: DISCONTINUED | OUTPATIENT
Start: 2021-01-25 | End: 2021-01-28 | Stop reason: HOSPADM

## 2021-01-25 RX ORDER — CARBOPROST TROMETHAMINE 250 UG/ML
250 INJECTION, SOLUTION INTRAMUSCULAR AS NEEDED
Status: DISCONTINUED | OUTPATIENT
Start: 2021-01-25 | End: 2021-01-26 | Stop reason: HOSPADM

## 2021-01-25 RX ORDER — METHYLERGONOVINE MALEATE 0.2 MG/ML
200 INJECTION INTRAVENOUS ONCE AS NEEDED
Status: DISCONTINUED | OUTPATIENT
Start: 2021-01-25 | End: 2021-01-26 | Stop reason: HOSPADM

## 2021-01-25 RX ORDER — IBUPROFEN 600 MG/1
600 TABLET ORAL EVERY 6 HOURS PRN
Status: DISCONTINUED | OUTPATIENT
Start: 2021-01-25 | End: 2021-01-26 | Stop reason: HOSPADM

## 2021-01-25 RX ORDER — OXYTOCIN-SODIUM CHLORIDE 0.9% IV SOLN 30 UNIT/500ML 30-0.9/5 UT/ML-%
2-30 SOLUTION INTRAVENOUS
Status: DISCONTINUED | OUTPATIENT
Start: 2021-01-25 | End: 2021-01-26 | Stop reason: HOSPADM

## 2021-01-25 RX ORDER — SODIUM CHLORIDE, SODIUM LACTATE, POTASSIUM CHLORIDE, CALCIUM CHLORIDE 600; 310; 30; 20 MG/100ML; MG/100ML; MG/100ML; MG/100ML
125 INJECTION, SOLUTION INTRAVENOUS CONTINUOUS
Status: DISCONTINUED | OUTPATIENT
Start: 2021-01-25 | End: 2021-01-26

## 2021-01-25 RX ORDER — SODIUM CHLORIDE 0.9 % (FLUSH) 0.9 %
10 SYRINGE (ML) INJECTION AS NEEDED
Status: DISCONTINUED | OUTPATIENT
Start: 2021-01-25 | End: 2021-01-27

## 2021-01-25 RX ORDER — MAGNESIUM CARB/ALUMINUM HYDROX 105-160MG
30 TABLET,CHEWABLE ORAL ONCE
Status: DISCONTINUED | OUTPATIENT
Start: 2021-01-25 | End: 2021-01-26 | Stop reason: HOSPADM

## 2021-01-25 RX ORDER — MEPERIDINE HYDROCHLORIDE 25 MG/ML
25 INJECTION INTRAMUSCULAR; INTRAVENOUS; SUBCUTANEOUS
Status: DISCONTINUED | OUTPATIENT
Start: 2021-01-25 | End: 2021-01-26 | Stop reason: HOSPADM

## 2021-01-25 RX ADMIN — OXYTOCIN 2 MILLI-UNITS/MIN: 10 INJECTION, SOLUTION INTRAMUSCULAR; INTRAVENOUS at 23:51

## 2021-01-25 RX ADMIN — MEPERIDINE HYDROCHLORIDE 25 MG: 25 INJECTION INTRAMUSCULAR; INTRAVENOUS; SUBCUTANEOUS at 19:58

## 2021-01-25 RX ADMIN — SODIUM CHLORIDE, POTASSIUM CHLORIDE, SODIUM LACTATE AND CALCIUM CHLORIDE 125 ML/HR: 600; 310; 30; 20 INJECTION, SOLUTION INTRAVENOUS at 12:50

## 2021-01-25 RX ADMIN — SODIUM CHLORIDE, POTASSIUM CHLORIDE, SODIUM LACTATE AND CALCIUM CHLORIDE 125 ML/HR: 600; 310; 30; 20 INJECTION, SOLUTION INTRAVENOUS at 23:51

## 2021-01-26 ENCOUNTER — ANESTHESIA EVENT (OUTPATIENT)
Dept: LABOR AND DELIVERY | Facility: HOSPITAL | Age: 31
End: 2021-01-26

## 2021-01-26 ENCOUNTER — ANESTHESIA (OUTPATIENT)
Dept: LABOR AND DELIVERY | Facility: HOSPITAL | Age: 31
End: 2021-01-26

## 2021-01-26 ENCOUNTER — APPOINTMENT (OUTPATIENT)
Dept: LABOR AND DELIVERY | Facility: HOSPITAL | Age: 31
End: 2021-01-26

## 2021-01-26 PROBLEM — O48.0 POST TERM PREGNANCY OVER 40 WEEKS: Status: RESOLVED | Noted: 2021-01-25 | Resolved: 2021-01-26

## 2021-01-26 PROBLEM — O36.8190 DECREASED FETAL MOVEMENT: Status: RESOLVED | Noted: 2021-01-03 | Resolved: 2021-01-26

## 2021-01-26 PROCEDURE — 25010000002 ROPIVACAINE PER 1 MG: Performed by: NURSE ANESTHETIST, CERTIFIED REGISTERED

## 2021-01-26 PROCEDURE — 10907ZC DRAINAGE OF AMNIOTIC FLUID, THERAPEUTIC FROM PRODUCTS OF CONCEPTION, VIA NATURAL OR ARTIFICIAL OPENING: ICD-10-PCS | Performed by: ADVANCED PRACTICE MIDWIFE

## 2021-01-26 PROCEDURE — 51702 INSERT TEMP BLADDER CATH: CPT

## 2021-01-26 PROCEDURE — 25010000002 FENTANYL CITRATE (PF) 100 MCG/2ML SOLUTION: Performed by: NURSE ANESTHETIST, CERTIFIED REGISTERED

## 2021-01-26 PROCEDURE — 3E033VJ INTRODUCTION OF OTHER HORMONE INTO PERIPHERAL VEIN, PERCUTANEOUS APPROACH: ICD-10-PCS | Performed by: ADVANCED PRACTICE MIDWIFE

## 2021-01-26 PROCEDURE — 25010000003 MEPERIDINE PER 100 MG: Performed by: ADVANCED PRACTICE MIDWIFE

## 2021-01-26 PROCEDURE — 25010000002 ONDANSETRON PER 1 MG: Performed by: NURSE ANESTHETIST, CERTIFIED REGISTERED

## 2021-01-26 PROCEDURE — 59025 FETAL NON-STRESS TEST: CPT

## 2021-01-26 PROCEDURE — C1755 CATHETER, INTRASPINAL: HCPCS

## 2021-01-26 PROCEDURE — C1755 CATHETER, INTRASPINAL: HCPCS | Performed by: ANESTHESIOLOGY

## 2021-01-26 PROCEDURE — 25010000002 ONDANSETRON PER 1 MG: Performed by: OBSTETRICS & GYNECOLOGY

## 2021-01-26 PROCEDURE — 0KQM0ZZ REPAIR PERINEUM MUSCLE, OPEN APPROACH: ICD-10-PCS | Performed by: ADVANCED PRACTICE MIDWIFE

## 2021-01-26 RX ORDER — LIDOCAINE HYDROCHLORIDE AND EPINEPHRINE 15; 5 MG/ML; UG/ML
INJECTION, SOLUTION EPIDURAL AS NEEDED
Status: DISCONTINUED | OUTPATIENT
Start: 2021-01-26 | End: 2021-01-26 | Stop reason: SURG

## 2021-01-26 RX ORDER — FENTANYL CITRATE 50 UG/ML
INJECTION, SOLUTION INTRAMUSCULAR; INTRAVENOUS AS NEEDED
Status: DISCONTINUED | OUTPATIENT
Start: 2021-01-26 | End: 2021-01-26 | Stop reason: SURG

## 2021-01-26 RX ORDER — LANOLIN
CREAM (ML) TOPICAL
Status: DISCONTINUED | OUTPATIENT
Start: 2021-01-26 | End: 2021-01-28 | Stop reason: HOSPADM

## 2021-01-26 RX ORDER — ONDANSETRON 2 MG/ML
4 INJECTION INTRAMUSCULAR; INTRAVENOUS EVERY 6 HOURS PRN
Status: DISCONTINUED | OUTPATIENT
Start: 2021-01-26 | End: 2021-01-27

## 2021-01-26 RX ORDER — IBUPROFEN 600 MG/1
600 TABLET ORAL EVERY 6 HOURS PRN
Status: DISCONTINUED | OUTPATIENT
Start: 2021-01-26 | End: 2021-01-28 | Stop reason: HOSPADM

## 2021-01-26 RX ORDER — DIPHENHYDRAMINE HYDROCHLORIDE 50 MG/ML
12.5 INJECTION INTRAMUSCULAR; INTRAVENOUS EVERY 8 HOURS PRN
Status: DISCONTINUED | OUTPATIENT
Start: 2021-01-26 | End: 2021-01-26 | Stop reason: HOSPADM

## 2021-01-26 RX ORDER — BUSPIRONE HYDROCHLORIDE 5 MG/1
10 TABLET ORAL EVERY 12 HOURS SCHEDULED
Status: DISCONTINUED | OUTPATIENT
Start: 2021-01-27 | End: 2021-01-28 | Stop reason: HOSPADM

## 2021-01-26 RX ORDER — PRENATAL VIT/IRON FUM/FOLIC AC 27MG-0.8MG
1 TABLET ORAL DAILY
Status: DISCONTINUED | OUTPATIENT
Start: 2021-01-27 | End: 2021-01-28 | Stop reason: HOSPADM

## 2021-01-26 RX ORDER — ONDANSETRON 2 MG/ML
4 INJECTION INTRAMUSCULAR; INTRAVENOUS ONCE AS NEEDED
Status: COMPLETED | OUTPATIENT
Start: 2021-01-26 | End: 2021-01-26

## 2021-01-26 RX ORDER — ACETAMINOPHEN 325 MG/1
650 TABLET ORAL EVERY 4 HOURS PRN
Status: DISCONTINUED | OUTPATIENT
Start: 2021-01-26 | End: 2021-01-28 | Stop reason: HOSPADM

## 2021-01-26 RX ORDER — TRISODIUM CITRATE DIHYDRATE AND CITRIC ACID MONOHYDRATE 500; 334 MG/5ML; MG/5ML
30 SOLUTION ORAL ONCE
Status: DISCONTINUED | OUTPATIENT
Start: 2021-01-26 | End: 2021-01-26 | Stop reason: HOSPADM

## 2021-01-26 RX ORDER — HYDROCORTISONE 25 MG/G
1 CREAM TOPICAL AS NEEDED
Status: DISCONTINUED | OUTPATIENT
Start: 2021-01-26 | End: 2021-01-28 | Stop reason: HOSPADM

## 2021-01-26 RX ORDER — FAMOTIDINE 10 MG/ML
20 INJECTION, SOLUTION INTRAVENOUS ONCE AS NEEDED
Status: DISCONTINUED | OUTPATIENT
Start: 2021-01-26 | End: 2021-01-26 | Stop reason: HOSPADM

## 2021-01-26 RX ORDER — DOCUSATE SODIUM 100 MG/1
100 CAPSULE, LIQUID FILLED ORAL 2 TIMES DAILY
Status: DISCONTINUED | OUTPATIENT
Start: 2021-01-27 | End: 2021-01-28 | Stop reason: HOSPADM

## 2021-01-26 RX ORDER — EPHEDRINE SULFATE 5 MG/ML
10 INJECTION INTRAVENOUS
Status: DISCONTINUED | OUTPATIENT
Start: 2021-01-26 | End: 2021-01-26 | Stop reason: HOSPADM

## 2021-01-26 RX ORDER — ROPIVACAINE HYDROCHLORIDE 2 MG/ML
15 INJECTION, SOLUTION EPIDURAL; INFILTRATION; PERINEURAL CONTINUOUS
Status: DISCONTINUED | OUTPATIENT
Start: 2021-01-26 | End: 2021-01-27

## 2021-01-26 RX ADMIN — LIDOCAINE HYDROCHLORIDE AND EPINEPHRINE 1 ML: 15; 5 INJECTION, SOLUTION EPIDURAL at 07:48

## 2021-01-26 RX ADMIN — ROPIVACAINE HYDROCHLORIDE 15 ML/HR: 2 INJECTION, SOLUTION EPIDURAL; INFILTRATION at 07:52

## 2021-01-26 RX ADMIN — ONDANSETRON 4 MG: 2 INJECTION INTRAMUSCULAR; INTRAVENOUS at 14:10

## 2021-01-26 RX ADMIN — ROPIVACAINE HYDROCHLORIDE: 2 INJECTION, SOLUTION EPIDURAL; INFILTRATION at 20:09

## 2021-01-26 RX ADMIN — SODIUM CHLORIDE, POTASSIUM CHLORIDE, SODIUM LACTATE AND CALCIUM CHLORIDE 1000 ML: 600; 310; 30; 20 INJECTION, SOLUTION INTRAVENOUS at 07:43

## 2021-01-26 RX ADMIN — LIDOCAINE HYDROCHLORIDE AND EPINEPHRINE 3 ML: 15; 5 INJECTION, SOLUTION EPIDURAL at 07:45

## 2021-01-26 RX ADMIN — ROPIVACAINE HYDROCHLORIDE 10 ML: 5 INJECTION, SOLUTION EPIDURAL; INFILTRATION; PERINEURAL at 07:50

## 2021-01-26 RX ADMIN — OXYTOCIN 650 ML/HR: 10 INJECTION, SOLUTION INTRAMUSCULAR; INTRAVENOUS at 22:08

## 2021-01-26 RX ADMIN — ROPIVACAINE HYDROCHLORIDE: 2 INJECTION, SOLUTION EPIDURAL; INFILTRATION at 14:10

## 2021-01-26 RX ADMIN — EPHEDRINE SULFATE 10 MG: 5 INJECTION INTRAVENOUS at 08:00

## 2021-01-26 RX ADMIN — FENTANYL CITRATE 100 MCG: 50 INJECTION, SOLUTION INTRAMUSCULAR; INTRAVENOUS at 07:48

## 2021-01-26 RX ADMIN — EPHEDRINE SULFATE 10 MG: 5 INJECTION INTRAVENOUS at 12:20

## 2021-01-26 RX ADMIN — SODIUM CHLORIDE, POTASSIUM CHLORIDE, SODIUM LACTATE AND CALCIUM CHLORIDE 125 ML/HR: 600; 310; 30; 20 INJECTION, SOLUTION INTRAVENOUS at 17:27

## 2021-01-26 RX ADMIN — ONDANSETRON 4 MG: 2 INJECTION INTRAMUSCULAR; INTRAVENOUS at 20:25

## 2021-01-26 RX ADMIN — IBUPROFEN 600 MG: 600 TABLET, FILM COATED ORAL at 22:26

## 2021-01-26 RX ADMIN — SODIUM CHLORIDE, POTASSIUM CHLORIDE, SODIUM LACTATE AND CALCIUM CHLORIDE 125 ML/HR: 600; 310; 30; 20 INJECTION, SOLUTION INTRAVENOUS at 10:01

## 2021-01-26 RX ADMIN — MEPERIDINE HYDROCHLORIDE 25 MG: 25 INJECTION INTRAMUSCULAR; INTRAVENOUS; SUBCUTANEOUS at 02:05

## 2021-01-26 NOTE — ANESTHESIA PREPROCEDURE EVALUATION
Anesthesia Evaluation     Patient summary reviewed and Nursing notes reviewed   NPO Solid Status: > 6 hours  NPO Liquid Status: < 2 hours           Airway   Mallampati: II  TM distance: >3 FB  Neck ROM: full  Dental      Pulmonary - negative pulmonary ROS   Cardiovascular - negative cardio ROS        Neuro/Psych- negative ROS  GI/Hepatic/Renal/Endo    (+) morbid obesity,      Musculoskeletal (-) negative ROS    Abdominal    Substance History - negative use     OB/GYN    (+) Pregnant,         Other - negative ROS                       Anesthesia Plan    ASA 3     epidural       Anesthetic plan, all risks, benefits, and alternatives have been provided, discussed and informed consent has been obtained with: patient.    Plan discussed with attending.

## 2021-01-26 NOTE — ANESTHESIA PROCEDURE NOTES
Labor Epidural      Patient reassessed immediately prior to procedure    Patient location during procedure: floor  Performed By  CRNA: Yuliana Barahona CRNA  Preanesthetic Checklist  Completed: patient identified, surgical consent, pre-op evaluation, timeout performed, IV checked, risks and benefits discussed and monitors and equipment checked  Prep:  Pt Position:sitting  Sterile Tech:cap, gloves, mask and sterile barrier  Prep:DuraPrep  Monitoring:blood pressure monitoring  Epidural Block Procedure:  Approach:midline  Guidance:landmark technique and palpation technique  Location:L3-L4  Needle Type:Tuohy  Needle Gauge:17 G  Loss of Resistance Medium: air  Loss of Resistance: 5cm  Cath Depth at skin:10 cm  Paresthesia: none  Aspiration:negative  Test Dose:negative  Number of Attempts: 1  Post Assessment:  Dressing:occlusive dressing applied and secured with tape  Pt Tolerance:patient tolerated the procedure well with no apparent complications  Complications:no

## 2021-01-27 PROBLEM — E66.01 MORBIDLY OBESE (HCC): Status: ACTIVE | Noted: 2021-01-27

## 2021-01-27 LAB
BASOPHILS # BLD AUTO: 0.08 10*3/MM3 (ref 0–0.2)
BASOPHILS NFR BLD AUTO: 0.4 % (ref 0–1.5)
DEPRECATED RDW RBC AUTO: 45.6 FL (ref 37–54)
EOSINOPHIL # BLD AUTO: 0.06 10*3/MM3 (ref 0–0.4)
EOSINOPHIL NFR BLD AUTO: 0.3 % (ref 0.3–6.2)
ERYTHROCYTE [DISTWIDTH] IN BLOOD BY AUTOMATED COUNT: 13.2 % (ref 12.3–15.4)
HCT VFR BLD AUTO: 32.7 % (ref 34–46.6)
HGB BLD-MCNC: 10.6 G/DL (ref 12–15.9)
IMM GRANULOCYTES # BLD AUTO: 0.19 10*3/MM3 (ref 0–0.05)
IMM GRANULOCYTES NFR BLD AUTO: 0.8 % (ref 0–0.5)
LYMPHOCYTES # BLD AUTO: 2.34 10*3/MM3 (ref 0.7–3.1)
LYMPHOCYTES NFR BLD AUTO: 10.4 % (ref 19.6–45.3)
MCH RBC QN AUTO: 30.6 PG (ref 26.6–33)
MCHC RBC AUTO-ENTMCNC: 32.4 G/DL (ref 31.5–35.7)
MCV RBC AUTO: 94.5 FL (ref 79–97)
MONOCYTES # BLD AUTO: 1.96 10*3/MM3 (ref 0.1–0.9)
MONOCYTES NFR BLD AUTO: 8.7 % (ref 5–12)
NEUTROPHILS NFR BLD AUTO: 17.83 10*3/MM3 (ref 1.7–7)
NEUTROPHILS NFR BLD AUTO: 79.4 % (ref 42.7–76)
NRBC BLD AUTO-RTO: 0 /100 WBC (ref 0–0.2)
PLATELET # BLD AUTO: 213 10*3/MM3 (ref 140–450)
PMV BLD AUTO: 10.6 FL (ref 6–12)
RBC # BLD AUTO: 3.46 10*6/MM3 (ref 3.77–5.28)
WBC # BLD AUTO: 22.46 10*3/MM3 (ref 3.4–10.8)

## 2021-01-27 PROCEDURE — 85025 COMPLETE CBC W/AUTO DIFF WBC: CPT | Performed by: ADVANCED PRACTICE MIDWIFE

## 2021-01-27 RX ADMIN — BUSPIRONE HYDROCHLORIDE 10 MG: 5 TABLET ORAL at 20:23

## 2021-01-27 RX ADMIN — ACETAMINOPHEN 650 MG: 325 TABLET, FILM COATED ORAL at 02:30

## 2021-01-27 RX ADMIN — DOCUSATE SODIUM 100 MG: 100 CAPSULE, LIQUID FILLED ORAL at 20:23

## 2021-01-27 RX ADMIN — WITCH HAZEL 1 PAD: 500 SOLUTION RECTAL; TOPICAL at 00:34

## 2021-01-27 RX ADMIN — BUSPIRONE HYDROCHLORIDE 10 MG: 5 TABLET ORAL at 00:33

## 2021-01-27 RX ADMIN — DOCUSATE SODIUM 100 MG: 100 CAPSULE, LIQUID FILLED ORAL at 09:09

## 2021-01-27 RX ADMIN — Medication: at 00:34

## 2021-01-27 RX ADMIN — IBUPROFEN 600 MG: 600 TABLET ORAL at 04:20

## 2021-01-27 RX ADMIN — Medication: at 00:33

## 2021-01-27 RX ADMIN — IBUPROFEN 600 MG: 600 TABLET ORAL at 15:36

## 2021-01-27 RX ADMIN — ACETAMINOPHEN 650 MG: 325 TABLET, FILM COATED ORAL at 20:23

## 2021-01-27 NOTE — ANESTHESIA POSTPROCEDURE EVALUATION
Patient: Cheyanne Santa    Procedure Summary     Date: 01/26/21 Room / Location:     Anesthesia Start: 0734 Anesthesia Stop: 2203    Procedure: LABOR ANALGESIA Diagnosis:     Scheduled Providers:  Provider: Faustino Garza MD    Anesthesia Type: epidural ASA Status: 3          Anesthesia Type: epidural    Vitals  Vitals Value Taken Time   BP 95/63 01/27/21 0735   Temp 97.7 °F (36.5 °C) 01/27/21 0735   Pulse 95 01/27/21 0735   Resp 18 01/27/21 0735   SpO2             Post Anesthesia Care and Evaluation    Patient location during evaluation: bedside  Patient participation: complete - patient participated  Level of consciousness: awake and awake and alert  Pain score: 0  Pain management: satisfactory to patient  Airway patency: patent  Anesthetic complications: No anesthetic complications  PONV Status: none  Cardiovascular status: acceptable  Respiratory status: acceptable  Hydration status: acceptable  Post Neuraxial Block status: Motor and sensory function returned to baseline and No signs or symptoms of PDPH

## 2021-01-28 VITALS
WEIGHT: 226 LBS | BODY MASS INDEX: 41.59 KG/M2 | RESPIRATION RATE: 18 BRPM | HEART RATE: 86 BPM | TEMPERATURE: 98 F | HEIGHT: 62 IN | SYSTOLIC BLOOD PRESSURE: 109 MMHG | DIASTOLIC BLOOD PRESSURE: 70 MMHG

## 2021-01-28 LAB
BASOPHILS # BLD AUTO: 0.07 10*3/MM3 (ref 0–0.2)
BASOPHILS NFR BLD AUTO: 0.5 % (ref 0–1.5)
DEPRECATED RDW RBC AUTO: 46.5 FL (ref 37–54)
EOSINOPHIL # BLD AUTO: 0.14 10*3/MM3 (ref 0–0.4)
EOSINOPHIL NFR BLD AUTO: 1 % (ref 0.3–6.2)
ERYTHROCYTE [DISTWIDTH] IN BLOOD BY AUTOMATED COUNT: 13.2 % (ref 12.3–15.4)
HCT VFR BLD AUTO: 32.2 % (ref 34–46.6)
HGB BLD-MCNC: 10.3 G/DL (ref 12–15.9)
IMM GRANULOCYTES # BLD AUTO: 0.12 10*3/MM3 (ref 0–0.05)
IMM GRANULOCYTES NFR BLD AUTO: 0.8 % (ref 0–0.5)
LYMPHOCYTES # BLD AUTO: 1.6 10*3/MM3 (ref 0.7–3.1)
LYMPHOCYTES NFR BLD AUTO: 11 % (ref 19.6–45.3)
MCH RBC QN AUTO: 30.7 PG (ref 26.6–33)
MCHC RBC AUTO-ENTMCNC: 32 G/DL (ref 31.5–35.7)
MCV RBC AUTO: 95.8 FL (ref 79–97)
MONOCYTES # BLD AUTO: 0.59 10*3/MM3 (ref 0.1–0.9)
MONOCYTES NFR BLD AUTO: 4.1 % (ref 5–12)
NEUTROPHILS NFR BLD AUTO: 12.02 10*3/MM3 (ref 1.7–7)
NEUTROPHILS NFR BLD AUTO: 82.6 % (ref 42.7–76)
NRBC BLD AUTO-RTO: 0 /100 WBC (ref 0–0.2)
PLATELET # BLD AUTO: 235 10*3/MM3 (ref 140–450)
PMV BLD AUTO: 10.7 FL (ref 6–12)
RBC # BLD AUTO: 3.36 10*6/MM3 (ref 3.77–5.28)
WBC # BLD AUTO: 14.54 10*3/MM3 (ref 3.4–10.8)

## 2021-01-28 PROCEDURE — 85025 COMPLETE CBC W/AUTO DIFF WBC: CPT | Performed by: ADVANCED PRACTICE MIDWIFE

## 2021-01-28 RX ORDER — BUSPIRONE HYDROCHLORIDE 10 MG/1
10 TABLET ORAL 2 TIMES DAILY
Qty: 180 TABLET | Refills: 1 | Status: SHIPPED | OUTPATIENT
Start: 2021-01-28 | End: 2021-01-29 | Stop reason: SDUPTHER

## 2021-01-28 RX ORDER — PSEUDOEPHEDRINE HCL 30 MG
100 TABLET ORAL 2 TIMES DAILY
Qty: 60 CAPSULE | Refills: 1 | Status: SHIPPED | OUTPATIENT
Start: 2021-01-28 | End: 2021-01-29

## 2021-01-28 RX ORDER — IBUPROFEN 600 MG/1
600 TABLET ORAL EVERY 6 HOURS PRN
Qty: 60 TABLET | Refills: 1 | Status: SHIPPED | OUTPATIENT
Start: 2021-01-28 | End: 2021-01-29

## 2021-01-28 RX ADMIN — DOCUSATE SODIUM 100 MG: 100 CAPSULE, LIQUID FILLED ORAL at 09:37

## 2021-01-28 RX ADMIN — IBUPROFEN 600 MG: 600 TABLET ORAL at 07:35

## 2021-01-28 RX ADMIN — BUSPIRONE HYDROCHLORIDE 10 MG: 5 TABLET ORAL at 09:37

## 2021-01-29 RX ORDER — PSEUDOEPHEDRINE HCL 30 MG
100 TABLET ORAL 2 TIMES DAILY
Qty: 60 CAPSULE | Refills: 1 | Status: SHIPPED | OUTPATIENT
Start: 2021-01-29 | End: 2022-07-05

## 2021-01-29 RX ORDER — IBUPROFEN 600 MG/1
600 TABLET ORAL EVERY 6 HOURS PRN
Qty: 60 TABLET | Refills: 1 | Status: SHIPPED | OUTPATIENT
Start: 2021-01-29

## 2021-01-29 RX ORDER — BUSPIRONE HYDROCHLORIDE 10 MG/1
10 TABLET ORAL 2 TIMES DAILY
Qty: 180 TABLET | Refills: 1 | Status: SHIPPED | OUTPATIENT
Start: 2021-01-29 | End: 2022-07-05

## 2021-01-31 ENCOUNTER — APPOINTMENT (OUTPATIENT)
Dept: ULTRASOUND IMAGING | Facility: HOSPITAL | Age: 31
End: 2021-01-31

## 2021-01-31 ENCOUNTER — HOSPITAL ENCOUNTER (EMERGENCY)
Facility: HOSPITAL | Age: 31
Discharge: HOME | End: 2021-01-31
Attending: EMERGENCY MEDICINE | Admitting: EMERGENCY MEDICINE

## 2021-01-31 VITALS
DIASTOLIC BLOOD PRESSURE: 78 MMHG | SYSTOLIC BLOOD PRESSURE: 105 MMHG | HEART RATE: 93 BPM | HEIGHT: 62 IN | TEMPERATURE: 98.1 F | OXYGEN SATURATION: 98 % | BODY MASS INDEX: 40.12 KG/M2 | WEIGHT: 218 LBS | RESPIRATION RATE: 16 BRPM

## 2021-01-31 LAB
ALBUMIN SERPL-MCNC: 3 G/DL (ref 3.5–5.2)
ALBUMIN/GLOB SERPL: 1.2 G/DL
ALP SERPL-CCNC: 103 U/L (ref 39–117)
ALT SERPL W P-5'-P-CCNC: 23 U/L (ref 1–33)
ANION GAP SERPL CALCULATED.3IONS-SCNC: 13 MMOL/L (ref 5–15)
AST SERPL-CCNC: 24 U/L (ref 1–32)
BASOPHILS # BLD AUTO: 0.06 10*3/MM3 (ref 0–0.2)
BASOPHILS NFR BLD AUTO: 0.5 % (ref 0–1.5)
BILIRUB SERPL-MCNC: 0.2 MG/DL (ref 0–1.2)
BUN SERPL-MCNC: 11 MG/DL (ref 6–20)
BUN/CREAT SERPL: 22 (ref 7–25)
CALCIUM SPEC-SCNC: 8.9 MG/DL (ref 8.6–10.5)
CHLORIDE SERPL-SCNC: 107 MMOL/L (ref 98–107)
CO2 SERPL-SCNC: 22 MMOL/L (ref 22–29)
CREAT SERPL-MCNC: 0.5 MG/DL (ref 0.57–1)
DEPRECATED RDW RBC AUTO: 44.1 FL (ref 37–54)
EOSINOPHIL # BLD AUTO: 0.34 10*3/MM3 (ref 0–0.4)
EOSINOPHIL NFR BLD AUTO: 2.9 % (ref 0.3–6.2)
ERYTHROCYTE [DISTWIDTH] IN BLOOD BY AUTOMATED COUNT: 13.2 % (ref 12.3–15.4)
GFR SERPL CREATININE-BSD FRML MDRD: 144 ML/MIN/1.73
GLOBULIN UR ELPH-MCNC: 2.5 GM/DL
GLUCOSE SERPL-MCNC: 107 MG/DL (ref 65–99)
HCG INTACT+B SERPL-ACNC: 314.2 MIU/ML
HCT VFR BLD AUTO: 32.1 % (ref 34–46.6)
HGB BLD-MCNC: 10.5 G/DL (ref 12–15.9)
HOLD SPECIMEN: NORMAL
HOLD SPECIMEN: NORMAL
IMM GRANULOCYTES # BLD AUTO: 0.08 10*3/MM3 (ref 0–0.05)
IMM GRANULOCYTES NFR BLD AUTO: 0.7 % (ref 0–0.5)
LYMPHOCYTES # BLD AUTO: 1.79 10*3/MM3 (ref 0.7–3.1)
LYMPHOCYTES NFR BLD AUTO: 15.2 % (ref 19.6–45.3)
MCH RBC QN AUTO: 30.3 PG (ref 26.6–33)
MCHC RBC AUTO-ENTMCNC: 32.7 G/DL (ref 31.5–35.7)
MCV RBC AUTO: 92.5 FL (ref 79–97)
MONOCYTES # BLD AUTO: 0.88 10*3/MM3 (ref 0.1–0.9)
MONOCYTES NFR BLD AUTO: 7.5 % (ref 5–12)
NEUTROPHILS NFR BLD AUTO: 73.2 % (ref 42.7–76)
NEUTROPHILS NFR BLD AUTO: 8.61 10*3/MM3 (ref 1.7–7)
NRBC BLD AUTO-RTO: 0 /100 WBC (ref 0–0.2)
PLATELET # BLD AUTO: 322 10*3/MM3 (ref 140–450)
PMV BLD AUTO: 10.1 FL (ref 6–12)
POTASSIUM SERPL-SCNC: 3.5 MMOL/L (ref 3.5–5.2)
PROT SERPL-MCNC: 5.5 G/DL (ref 6–8.5)
RBC # BLD AUTO: 3.47 10*6/MM3 (ref 3.77–5.28)
SODIUM SERPL-SCNC: 142 MMOL/L (ref 136–145)
WBC # BLD AUTO: 11.76 10*3/MM3 (ref 3.4–10.8)
WHOLE BLOOD HOLD SPECIMEN: NORMAL
WHOLE BLOOD HOLD SPECIMEN: NORMAL

## 2021-01-31 PROCEDURE — 99283 EMERGENCY DEPT VISIT LOW MDM: CPT

## 2021-01-31 PROCEDURE — 80053 COMPREHEN METABOLIC PANEL: CPT | Performed by: EMERGENCY MEDICINE

## 2021-01-31 PROCEDURE — 84702 CHORIONIC GONADOTROPIN TEST: CPT | Performed by: EMERGENCY MEDICINE

## 2021-01-31 PROCEDURE — 85025 COMPLETE CBC W/AUTO DIFF WBC: CPT

## 2021-01-31 RX ORDER — SODIUM CHLORIDE 0.9 % (FLUSH) 0.9 %
10 SYRINGE (ML) INJECTION AS NEEDED
Status: DISCONTINUED | OUTPATIENT
Start: 2021-01-31 | End: 2021-01-31 | Stop reason: HOSPADM

## 2021-02-02 ENCOUNTER — HOSPITAL ENCOUNTER (OUTPATIENT)
Dept: LACTATION | Facility: HOSPITAL | Age: 31
Discharge: HOME | End: 2021-02-02

## 2021-02-02 NOTE — LACTATION NOTE
02/02/21 1145   Maternal Information   Date of Referral 02/02/21   Person Making Referral patient  (Requests help with better latching)   Maternal Reason for Referral breast/nipple pain;latch difficulty   Infant Reason for Referral other (see comments)  (Weighed 8-11 at birth, weighs 8-6 today at 7 days old)   Maternal Assessment   Breast Size Issue none   Breast Shape Bilateral:;round   Breast Density Bilateral:;full   Nipples Bilateral:;graspable   Left Nipple Symptoms intact;tender   Right Nipple Symptoms intact;tender   Maternal Infant Feeding   Maternal Emotional State receptive;relaxed   Infant Positioning clutch/football;cross-cradle   Signs of Milk Transfer audible swallow;breasts soften with feeding   Pain with Feeding no  (Instructed nipple to baby's nose for comfortable latch)   Comfort Measures Before/During Feeding infant position adjusted;latch adjusted   Comfort Measures Following Feeding air-drying encouraged  (Dr. Flores's nipple cream and shells)   Nipple Shape After Feeding, Left Breast round;symmetrical  (Gave 20mm nipple shield if needed)   Nipple Shape After Feeding, Right compression stripe   Latch Assistance minimal assistance   Feeding Infant   Feeding Readiness Cues rooting   Satiety Cues sleeping after feeding   Feeding Tolerance/Success coordinated suck/swallow   Effective Latch During Feeding yes   Suck/Swallow Coordination present   Prefeeding Weight (gm) 3820 g (134.8 oz)   Postfeeding Weight (gm) 3930 g (138.6 oz)   Weight Gain/Loss (gm)  110 g (3.9 oz)   Breastfeeding Session   Breastfeeding breastfeeding, bilateral   Breastfeeding Time, Left (min) 15   Breastfeeding Time, Right (min) 15   LATCH Score   Latch 2-->grasps breast, tongue down, lips flanged, rhythmic sucking   Audible Swallowing 2-->spontaneous and intermittent (24 hrs old)   Type of Nipple 2-->everted (after stimulation)   Comfort (Breast/Nipple) 1-->filling, red/small blisters/bruises, mild/mod discomfort   Hold  (Positioning) 1-->minimal assist, teach one side, mother does other, staff holds   Latch Score 8

## 2021-02-04 ENCOUNTER — TRANSCRIBE ORDERS (OUTPATIENT)
Dept: LAB | Facility: HOSPITAL | Age: 31
End: 2021-02-04

## 2021-02-04 ENCOUNTER — LAB (OUTPATIENT)
Dept: LAB | Facility: HOSPITAL | Age: 31
End: 2021-02-04

## 2021-02-04 DIAGNOSIS — N92.0 EXCESSIVE OR FREQUENT MENSTRUATION: Primary | ICD-10-CM

## 2021-02-04 DIAGNOSIS — N92.0 EXCESSIVE OR FREQUENT MENSTRUATION: ICD-10-CM

## 2021-02-04 LAB
DEPRECATED RDW RBC AUTO: 39.9 FL (ref 37–54)
ERYTHROCYTE [DISTWIDTH] IN BLOOD BY AUTOMATED COUNT: 12.1 % (ref 12.3–15.4)
HCT VFR BLD AUTO: 37.8 % (ref 34–46.6)
HGB BLD-MCNC: 12.9 G/DL (ref 12–15.9)
MCH RBC QN AUTO: 30.9 PG (ref 26.6–33)
MCHC RBC AUTO-ENTMCNC: 34.1 G/DL (ref 31.5–35.7)
MCV RBC AUTO: 90.6 FL (ref 79–97)
PLATELET # BLD AUTO: 485 10*3/MM3 (ref 140–450)
PMV BLD AUTO: 9.8 FL (ref 6–12)
RBC # BLD AUTO: 4.17 10*6/MM3 (ref 3.77–5.28)
WBC # BLD AUTO: 9.52 10*3/MM3 (ref 3.4–10.8)

## 2021-02-04 PROCEDURE — 36415 COLL VENOUS BLD VENIPUNCTURE: CPT

## 2021-02-04 PROCEDURE — 85027 COMPLETE CBC AUTOMATED: CPT

## 2021-02-04 PROCEDURE — 87086 URINE CULTURE/COLONY COUNT: CPT | Performed by: ADVANCED PRACTICE MIDWIFE

## 2021-02-05 LAB — BACTERIA SPEC AEROBE CULT: NORMAL

## 2021-03-02 ENCOUNTER — HOSPITAL ENCOUNTER (OUTPATIENT)
Dept: LACTATION | Facility: HOSPITAL | Age: 31
Discharge: HOME | End: 2021-03-02

## 2021-03-03 NOTE — LACTATION NOTE
03/02/21 1545   Maternal Information   Date of Referral 03/02/21   Person Making Referral patient   Maternal Reason for Referral breast/nipple pain;other (see comments)  (Bilateral nipple pain after nursing. Possible yeast)   Infant Reason for Referral other (see comments)  (Weighed 8-11 at birth, weighs10-10 today at 1 month 3 days)   Maternal Assessment   Breast Size Issue none   Breast Shape Bilateral:;pendulous;round   Breast Density Bilateral:;full   Nipples Bilateral:;short   Left Nipple Symptoms redness;tender;other (see comments)  (Yeast?)   Right Nipple Symptoms redness;tender;other (see comments)  (Yeast?)   Maternal Infant Feeding   Maternal Emotional State receptive;relaxed   Infant Positioning cross-cradle   Signs of Milk Transfer audible swallow   Pain with Feeding no  (Pain after nursing)   Comfort Measures Following Feeding breast cream/oil applied;other (see comments)  (Contact OB about possible yeast)   Nipple Shape After Feeding, Left Breast round   Nipple Shape After Feeding, Right round   Latch Assistance none needed   Feeding Infant   Feeding Readiness Cues rooting   Satiety Cues sleeping after feeding   Feeding Tolerance/Success coordinated suck/swallow   Effective Latch During Feeding yes   Suck/Swallow Coordination present   Prefeeding Weight (gm) 4835 g (170.6 oz)   Postfeeding Weight (gm) 4995 g (176.2 oz)   Weight Gain/Loss (gm)  160 g (5.6 oz)   Breastfeeding Session   Breastfeeding breastfeeding, bilateral   Breastfeeding Time, Left (min) 15   Breastfeeding Time, Right (min) 10   LATCH Score   Latch 2-->grasps breast, tongue down, lips flanged, rhythmic sucking   Audible Swallowing 2-->spontaneous and intermittent (24 hrs old)   Type of Nipple 2-->everted (after stimulation)   Comfort (Breast/Nipple) 1-->filling, red/small blisters/bruises, mild/mod discomfort   Hold (Positioning) 2-->no assist from staff, mother able to position/hold infant   Latch Score 9   Lactation Referrals    Lactation Referrals other (see comments)  (Contact OB for possible yeast)

## 2021-11-01 ENCOUNTER — TRANSCRIBE ORDERS (OUTPATIENT)
Dept: LAB | Facility: HOSPITAL | Age: 31
End: 2021-11-01

## 2021-11-01 ENCOUNTER — LAB (OUTPATIENT)
Dept: LAB | Facility: HOSPITAL | Age: 31
End: 2021-11-01

## 2021-11-01 DIAGNOSIS — R53.83 FATIGUE, UNSPECIFIED TYPE: Primary | ICD-10-CM

## 2021-11-01 DIAGNOSIS — R53.83 FATIGUE, UNSPECIFIED TYPE: ICD-10-CM

## 2021-11-01 PROCEDURE — 82306 VITAMIN D 25 HYDROXY: CPT

## 2021-11-01 PROCEDURE — 84443 ASSAY THYROID STIM HORMONE: CPT

## 2021-11-01 PROCEDURE — 82607 VITAMIN B-12: CPT

## 2021-11-01 PROCEDURE — 36415 COLL VENOUS BLD VENIPUNCTURE: CPT

## 2021-11-02 LAB
25(OH)D3 SERPL-MCNC: 30.8 NG/ML (ref 30–100)
TSH SERPL DL<=0.05 MIU/L-ACNC: 0.05 UIU/ML (ref 0.27–4.2)
VIT B12 BLD-MCNC: 719 PG/ML (ref 211–946)

## 2022-05-11 ENCOUNTER — TRANSCRIBE ORDERS (OUTPATIENT)
Dept: LAB | Facility: HOSPITAL | Age: 32
End: 2022-05-11

## 2022-05-11 ENCOUNTER — LAB (OUTPATIENT)
Dept: LAB | Facility: HOSPITAL | Age: 32
End: 2022-05-11

## 2022-05-11 DIAGNOSIS — R63.5 ABNORMAL WEIGHT GAIN: ICD-10-CM

## 2022-05-11 DIAGNOSIS — R63.5 ABNORMAL WEIGHT GAIN: Primary | ICD-10-CM

## 2022-05-11 LAB — TSH SERPL DL<=0.05 MIU/L-ACNC: 3.08 UIU/ML (ref 0.27–4.2)

## 2022-05-11 PROCEDURE — 84443 ASSAY THYROID STIM HORMONE: CPT

## 2022-05-11 PROCEDURE — 36415 COLL VENOUS BLD VENIPUNCTURE: CPT

## 2022-07-05 ENCOUNTER — TELEMEDICINE (OUTPATIENT)
Dept: FAMILY MEDICINE CLINIC | Facility: CLINIC | Age: 32
End: 2022-07-05

## 2022-07-05 ENCOUNTER — CLINICAL SUPPORT (OUTPATIENT)
Dept: FAMILY MEDICINE CLINIC | Facility: CLINIC | Age: 32
End: 2022-07-05

## 2022-07-05 DIAGNOSIS — G43.809 OTHER MIGRAINE WITHOUT STATUS MIGRAINOSUS, NOT INTRACTABLE: Primary | ICD-10-CM

## 2022-07-05 DIAGNOSIS — G43.009 MIGRAINE WITHOUT AURA AND WITHOUT STATUS MIGRAINOSUS, NOT INTRACTABLE: Primary | ICD-10-CM

## 2022-07-05 PROBLEM — G43.909 MIGRAINE: Status: ACTIVE | Noted: 2022-07-05

## 2022-07-05 PROBLEM — E28.2 PCOS (POLYCYSTIC OVARIAN SYNDROME): Status: ACTIVE | Noted: 2022-07-05

## 2022-07-05 PROCEDURE — 99214 OFFICE O/P EST MOD 30 MIN: CPT | Performed by: NURSE PRACTITIONER

## 2022-07-05 PROCEDURE — 96372 THER/PROPH/DIAG INJ SC/IM: CPT | Performed by: NURSE PRACTITIONER

## 2022-07-05 RX ORDER — PROMETHAZINE HYDROCHLORIDE 25 MG/ML
25 INJECTION, SOLUTION INTRAMUSCULAR; INTRAVENOUS ONCE
Status: COMPLETED | OUTPATIENT
Start: 2022-07-05 | End: 2022-07-05

## 2022-07-05 RX ORDER — PROMETHAZINE HYDROCHLORIDE 50 MG/ML
25 INJECTION, SOLUTION INTRAMUSCULAR; INTRAVENOUS ONCE
Status: DISCONTINUED | OUTPATIENT
Start: 2022-07-05 | End: 2022-07-05

## 2022-07-05 RX ORDER — ACETAMINOPHEN AND CODEINE PHOSPHATE 120; 12 MG/5ML; MG/5ML
SOLUTION ORAL
COMMUNITY
Start: 2022-07-01

## 2022-07-05 RX ORDER — KETOROLAC TROMETHAMINE 30 MG/ML
60 INJECTION, SOLUTION INTRAMUSCULAR; INTRAVENOUS ONCE
Status: COMPLETED | OUTPATIENT
Start: 2022-07-05 | End: 2022-07-05

## 2022-07-05 RX ADMIN — KETOROLAC TROMETHAMINE 60 MG: 30 INJECTION, SOLUTION INTRAMUSCULAR; INTRAVENOUS at 15:49

## 2022-07-05 RX ADMIN — PROMETHAZINE HYDROCHLORIDE 25 MG: 25 INJECTION, SOLUTION INTRAMUSCULAR; INTRAVENOUS at 16:01

## 2022-07-05 NOTE — PROGRESS NOTES
Chief Complaint  Migraine  This was an audio and video enabled telemedicine encounter.  Subjective          Cheyanne Wilson presents to [facility]  Pt is here to discuss migraines. She has had intermittent migraines that seem to be worsening in intensity and frequency. She began a headache last night.       Objective   Vital Signs:   There were no vitals taken for this visit.    There is no height or weight on file to calculate BMI.    Review of Systems   Constitutional: Negative for fatigue and fever.   Respiratory: Negative for shortness of breath.    Cardiovascular: Negative for chest pain, palpitations and leg swelling.   Neurological: Negative for syncope.   Psychiatric/Behavioral: The patient is not nervous/anxious.           Current Outpatient Medications:   •  ibuprofen (ADVIL,MOTRIN) 600 MG tablet, Take 1 tablet by mouth Every 6 (Six) Hours As Needed for Mild Pain ., Disp: 60 tablet, Rfl: 1  •  norethindrone (MICRONOR) 0.35 MG tablet, , Disp: , Rfl:     Current Facility-Administered Medications:   •  ketorolac (TORADOL) injection 60 mg, 60 mg, Intramuscular, Once, Maddison Liu, APRN  •  promethazine (PHENERGAN) injection 25 mg, 25 mg, Intramuscular, Once, Maddison Liu, APRN      Allergies: Morphine, Reglan [metoclopramide], and Keflex [cephalexin]    Physical Exam  Constitutional:       Appearance: Normal appearance.   Neurological:      Mental Status: She is alert.   Psychiatric:         Mood and Affect: Mood normal.         Behavior: Behavior normal.         Thought Content: Thought content normal.         Judgment: Judgment normal.          Result Review :                   Assessment and Plan    Diagnoses and all orders for this visit:    1. Other migraine without status migrainosus, not intractable (Primary)  Comments:  Patient will come to the office for a Toradol and Phenergan injection.  She will call back when she stops breast-feeding and I  will send maintenance med.  Assessment & Plan:  Go to the ER for worsening symptoms.        Orders:  -     ketorolac (TORADOL) injection 60 mg  -     promethazine (PHENERGAN) injection 25 mg                 Follow Up   No follow-ups on file.  Patient was given instructions and counseling regarding her condition or for health maintenance advice. Please see specific information pulled into the AVS if appropriate.     ALEX Rojas
